# Patient Record
Sex: MALE | Race: WHITE
[De-identification: names, ages, dates, MRNs, and addresses within clinical notes are randomized per-mention and may not be internally consistent; named-entity substitution may affect disease eponyms.]

---

## 2020-03-08 ENCOUNTER — HOSPITAL ENCOUNTER (EMERGENCY)
Dept: HOSPITAL 8 - ED | Age: 55
LOS: 1 days | Discharge: HOME | End: 2020-03-09
Payer: MEDICAID

## 2020-03-08 VITALS — SYSTOLIC BLOOD PRESSURE: 110 MMHG | DIASTOLIC BLOOD PRESSURE: 79 MMHG

## 2020-03-08 VITALS — BODY MASS INDEX: 21.35 KG/M2 | HEIGHT: 67 IN | WEIGHT: 136.03 LBS

## 2020-03-08 DIAGNOSIS — Z21: ICD-10-CM
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
## 2020-03-08 DIAGNOSIS — S01.81XA: Primary | ICD-10-CM
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
## 2020-03-08 PROCEDURE — 99284 EMERGENCY DEPT VISIT MOD MDM: CPT

## 2020-03-08 PROCEDURE — 12051 INTMD RPR FACE/MM 2.5 CM/<: CPT

## 2020-03-18 ENCOUNTER — HOSPITAL ENCOUNTER (EMERGENCY)
Dept: HOSPITAL 8 - ED | Age: 55
Discharge: LEFT BEFORE DISCHARGE | End: 2020-03-18
Payer: MEDICAID

## 2020-03-18 VITALS — WEIGHT: 139.77 LBS | BODY MASS INDEX: 21.94 KG/M2 | HEIGHT: 67 IN

## 2020-03-18 VITALS — DIASTOLIC BLOOD PRESSURE: 76 MMHG | SYSTOLIC BLOOD PRESSURE: 128 MMHG

## 2020-03-18 DIAGNOSIS — R05: Primary | ICD-10-CM

## 2020-03-18 DIAGNOSIS — Z20.828: ICD-10-CM

## 2020-03-18 DIAGNOSIS — S01.411D: ICD-10-CM
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
## 2020-03-18 PROCEDURE — 71046 X-RAY EXAM CHEST 2 VIEWS: CPT

## 2020-03-18 PROCEDURE — 99283 EMERGENCY DEPT VISIT LOW MDM: CPT

## 2020-03-18 NOTE — NUR
CHARGE RN: DR BOWIE AWARE, PT POSITIVE PNEUMONIA VIA CXR.  PT HAS GEN DELIVERY 
ADDRESS, NO PHONE NUMBER.

## 2020-06-17 ENCOUNTER — OFFICE VISIT (OUTPATIENT)
Dept: MEDICAL GROUP | Facility: MEDICAL CENTER | Age: 55
End: 2020-06-17
Attending: NURSE PRACTITIONER
Payer: MEDICAID

## 2020-06-17 VITALS
HEIGHT: 70 IN | BODY MASS INDEX: 20.76 KG/M2 | DIASTOLIC BLOOD PRESSURE: 60 MMHG | OXYGEN SATURATION: 98 % | HEART RATE: 68 BPM | RESPIRATION RATE: 16 BRPM | TEMPERATURE: 99.3 F | SYSTOLIC BLOOD PRESSURE: 100 MMHG | WEIGHT: 145 LBS

## 2020-06-17 DIAGNOSIS — R10.84 GENERALIZED ABDOMINAL PAIN: ICD-10-CM

## 2020-06-17 DIAGNOSIS — Z76.89 ENCOUNTER TO ESTABLISH CARE: ICD-10-CM

## 2020-06-17 DIAGNOSIS — J45.40 MODERATE PERSISTENT ASTHMA WITHOUT COMPLICATION: ICD-10-CM

## 2020-06-17 DIAGNOSIS — G89.29 OTHER CHRONIC PAIN: ICD-10-CM

## 2020-06-17 DIAGNOSIS — B20 HIV INFECTION, UNSPECIFIED SYMPTOM STATUS (HCC): ICD-10-CM

## 2020-06-17 DIAGNOSIS — B00.9 HSV-2 (HERPES SIMPLEX VIRUS 2) INFECTION: ICD-10-CM

## 2020-06-17 DIAGNOSIS — R11.0 NAUSEA: ICD-10-CM

## 2020-06-17 DIAGNOSIS — R12 HEARTBURN: ICD-10-CM

## 2020-06-17 PROCEDURE — 99204 OFFICE O/P NEW MOD 45 MIN: CPT | Performed by: NURSE PRACTITIONER

## 2020-06-17 RX ORDER — IBUPROFEN 800 MG/1
800 TABLET ORAL 3 TIMES DAILY
Qty: 90 TAB | Refills: 3 | Status: SHIPPED | OUTPATIENT
Start: 2020-06-17 | End: 2020-12-07 | Stop reason: SDUPTHER

## 2020-06-17 RX ORDER — DICYCLOMINE HYDROCHLORIDE 10 MG/1
10 CAPSULE ORAL 3 TIMES DAILY
Qty: 90 CAP | Refills: 3 | Status: SHIPPED | OUTPATIENT
Start: 2020-06-17 | End: 2020-08-10 | Stop reason: SDUPTHER

## 2020-06-17 RX ORDER — DARUNAVIR ETHANOLATE AND COBICISTAT 800; 150 MG/1; MG/1
1 TABLET, FILM COATED ORAL DAILY
Qty: 30 TAB | Refills: 5 | Status: SHIPPED | OUTPATIENT
Start: 2020-06-17 | End: 2020-08-10 | Stop reason: SDUPTHER

## 2020-06-17 RX ORDER — SULFAMETHOXAZOLE AND TRIMETHOPRIM 800; 160 MG/1; MG/1
1 TABLET ORAL 2 TIMES DAILY
Qty: 10 TAB | Refills: 0 | Status: SHIPPED | OUTPATIENT
Start: 2020-06-17 | End: 2020-06-22

## 2020-06-17 RX ORDER — TIOTROPIUM BROMIDE AND OLODATEROL 3.124; 2.736 UG/1; UG/1
2 SPRAY, METERED RESPIRATORY (INHALATION) DAILY
Qty: 1 INHALER | Refills: 5 | Status: SHIPPED | OUTPATIENT
Start: 2020-06-17 | End: 2020-08-10 | Stop reason: SDUPTHER

## 2020-06-17 RX ORDER — ONDANSETRON 4 MG/1
4 TABLET, FILM COATED ORAL EVERY 4 HOURS PRN
Qty: 20 TAB | Refills: 5 | Status: SHIPPED | OUTPATIENT
Start: 2020-06-17 | End: 2020-07-13 | Stop reason: SDUPTHER

## 2020-06-17 RX ORDER — ALBUTEROL SULFATE 90 UG/1
2 AEROSOL, METERED RESPIRATORY (INHALATION) EVERY 6 HOURS PRN
Qty: 8.5 G | Refills: 11 | Status: SHIPPED | OUTPATIENT
Start: 2020-06-17 | End: 2020-08-10 | Stop reason: SDUPTHER

## 2020-06-17 RX ORDER — ABACAVIR SULFATE, DOLUTEGRAVIR SODIUM, LAMIVUDINE 600; 50; 300 MG/1; MG/1; MG/1
1 TABLET, FILM COATED ORAL DAILY
Qty: 30 TAB | Refills: 3 | Status: SHIPPED | OUTPATIENT
Start: 2020-06-17 | End: 2020-08-10 | Stop reason: SDUPTHER

## 2020-06-17 RX ORDER — PANTOPRAZOLE SODIUM 20 MG/1
20 TABLET, DELAYED RELEASE ORAL DAILY
Qty: 30 TAB | Refills: 3 | Status: SHIPPED | OUTPATIENT
Start: 2020-06-17 | End: 2020-08-10 | Stop reason: SDUPTHER

## 2020-06-17 RX ORDER — ACYCLOVIR 800 MG/1
800 TABLET ORAL 3 TIMES DAILY
Qty: 15 TAB | Refills: 3 | Status: SHIPPED | OUTPATIENT
Start: 2020-06-17

## 2020-06-17 RX ORDER — ACYCLOVIR 400 MG/1
400 TABLET ORAL 2 TIMES DAILY
Qty: 60 TAB | Refills: 5 | Status: SHIPPED | OUTPATIENT
Start: 2020-06-17 | End: 2020-07-13 | Stop reason: SDUPTHER

## 2020-06-17 RX ORDER — ACYCLOVIR 800 MG/1
800 TABLET ORAL 3 TIMES DAILY
Qty: 9 TAB | Refills: 3 | Status: SHIPPED | OUTPATIENT
Start: 2020-06-17 | End: 2020-06-17

## 2020-06-17 ASSESSMENT — ENCOUNTER SYMPTOMS
WHEEZING: 1
PALPITATIONS: 0
DIARRHEA: 0
ABDOMINAL PAIN: 0
CONSTIPATION: 0
CHILLS: 0
WEIGHT LOSS: 1
SHORTNESS OF BREATH: 1
FEVER: 0
COUGH: 0
BLOOD IN STOOL: 0

## 2020-06-17 NOTE — PROGRESS NOTES
"Chief Complaint   Patient presents with   • Establish Care       Subjective:     HPI:   Paul Chamberlain is a 55 y.o. male here to establish care, med refill,  and to discuss the evaluation and management of:      Encounter to establish care  Prior PCP: KATI SMALLS and Infectious disease    Other Providers:  none    HIV infection (HCC)  Diagnosed in 1990.  Currently taking Triumeq and Prezcobix.  He was previously managed at [facility].  He is requesting a new infectious disease referral.    HSV-2 (herpes simplex virus 2) infection  Current outbreak on his rectum, started several days ago.  He reports a foul odor.  He denies fever.  He reports foul-smelling purulent drainage.  In the past he is taking acyclovir and Bactrim with benefit.    Moderate persistent asthma without complication  Patient reports a many year history of asthma.  He uses albuterol as needed and Spiriva daily.  He reports he is using his rescue inhaler multiple times a day.  He reports he wakes at least once a night feeling short of breath.  He is not able to participate in many activities of daily living due to HIV related wasting.  He and his sister report that he does not take any steroid medications because \"all steroids are bad for you\".        ROS  Review of Systems   Constitutional: Positive for malaise/fatigue and weight loss. Negative for chills and fever.   Respiratory: Positive for shortness of breath and wheezing. Negative for cough.    Cardiovascular: Negative for chest pain, palpitations and leg swelling.   Gastrointestinal: Negative for abdominal pain, blood in stool, constipation and diarrhea.         Not on File    Current medicines (including changes today)  Current Outpatient Medications   Medication Sig Dispense Refill   • acyclovir (ZOVIRAX) 400 MG tablet Take 1 Tab by mouth 2 times a day. prophylactic dose 60 Tab 5   • ibuprofen (MOTRIN) 800 MG Tab Take 1 Tab by mouth 3 times a day. 90 Tab 3   • pantoprazole (PROTONIX) 20 MG " tablet Take 1 Tab by mouth every day. 30 Tab 3   • albuterol 108 (90 Base) MCG/ACT Aero Soln inhalation aerosol Inhale 2 Puffs by mouth every 6 hours as needed for Shortness of Breath. 8.5 g 11   • dicyclomine (BENTYL) 10 MG Cap Take 1 Cap by mouth 3 times a day. 90 Cap 3   • Tiotropium Bromide-Olodaterol (STIOLTO RESPIMAT) 2.5-2.5 MCG/ACT Aero Soln Inhale 2 Inhalation by mouth every day. 1 Inhaler 5   • sulfamethoxazole-trimethoprim (BACTRIM DS) 800-160 MG tablet Take 1 Tab by mouth 2 times a day for 5 days. 10 Tab 0   • Abacavir-Dolutegravir-Lamivud (TRIUMEQ) 600- MG Tab Take 1 Dose by mouth every day. 30 Tab 3   • Darunavir-Cobicistat (PREZCOBIX) 800-150 MG Tab Take 1 Dose by mouth every day. 30 Tab 5   • acyclovir (ZOVIRAX) 800 MG Tab Take 1 Tab by mouth 3 times a day. With herpes outbreak 15 Tab 3   • ondansetron (ZOFRAN) 4 MG Tab tablet Take 1 Tab by mouth every four hours as needed for Nausea/Vomiting. 20 Tab 5     No current facility-administered medications for this visit.      He  has a past medical history of Asthma, Avascular necrosis (HCC), COPD (chronic obstructive pulmonary disease) (HCC), DDD (degenerative disc disease), cervical, DDD (degenerative disc disease), thoracolumbar, HIV positive ([facility]) (04/1990), HSV-2 (herpes simplex virus 2) infection, Neuropathy, and Tinnitus.  He  has a past surgical history that includes fistula in ano repair and other orthopedic surgery (Left).  Social History     Tobacco Use   • Smoking status: Never Smoker   • Smokeless tobacco: Current User     Types: Chew   Substance Use Topics   • Alcohol use: Not on file   • Drug use: Not on file       Family History   Problem Relation Age of Onset   • Cancer Mother         unknown type, ovarian CA   • Arthritis Mother         RA   • Cancer Father         prostate CA   • Asthma Father    • Arthritis Father         RA   • Cancer Sister         unknown   • Cancer Maternal Grandmother         unknown   • Arthritis Sister      "    RA     No family status information on file.       Patient Active Problem List    Diagnosis Date Noted   • Encounter to establish care 06/17/2020   • HSV-2 (herpes simplex virus 2) infection 06/17/2020   • HIV infection (HCC) 06/17/2020   • Moderate persistent asthma without complication 06/17/2020          Objective:     /60 (BP Location: Left arm, Patient Position: Sitting, BP Cuff Size: Adult)   Pulse 68   Temp 37.4 °C (99.3 °F) (Temporal)   Resp 16   Ht 1.778 m (5' 10\")   Wt 65.8 kg (145 lb)   SpO2 98%  Body mass index is 20.81 kg/m².    Physical Exam:  Physical Exam   Constitutional: He is oriented to person, place, and time. He appears malnourished. He appears unhealthy. He appears cachectic. No distress.   HENT:   Head: Normocephalic.   Right Ear: Tympanic membrane and external ear normal.   Left Ear: Tympanic membrane and external ear normal.   Eyes: Pupils are equal, round, and reactive to light. Conjunctivae and EOM are normal.   Neck: Normal range of motion. Neck supple. No tracheal deviation present.   Cardiovascular: Normal rate, regular rhythm, normal heart sounds and intact distal pulses.   Pulmonary/Chest: Effort normal and breath sounds normal.   Abdominal: Soft. Bowel sounds are normal.   Genitourinary:    Genitourinary Comments: Presence of HSV infection on rectum, multiple lesions.  Purulent drainage from 1 lesion.  Area warm to touch.  Area erythematous     Musculoskeletal: Normal range of motion.   Lymphadenopathy:        Head (right side): No preauricular adenopathy present.        Head (left side): No preauricular adenopathy present.     He has no cervical adenopathy.   Neurological: He is alert and oriented to person, place, and time. He has normal sensation, normal strength and intact cranial nerves. Gait normal.   Skin: Skin is warm and dry.   Psychiatric: Affect and judgment normal.          Assessment and Plan:     The following treatment plan was discussed:    1. HSV-2 " (herpes simplex virus 2) infection  acyclovir (ZOVIRAX) 400 MG tablet    sulfamethoxazole-trimethoprim (BACTRIM DS) 800-160 MG tablet    acyclovir (ZOVIRAX) 800 MG Tab    -Chronic problem, unstable.  Suspect secondary infection.  Will treat with high-dose acyclovir 800 mg 3 times daily for 5 days and Bactrim.  Patient will return next week to ensure resolution of infection.  Strict ER precautions reviewed multiple times with patient and his sister, both verbalized understanding.  Upon resolution of his HSV outbreak he will resume his prophylactic dose of acyclovir 400 mg twice daily.  We had initially scheduled the patient for follow-up for a recheck of this infection on Tuesday at 6/23 at 720.  The patient's sister reported that they would be unable to obtain that appointment due to it being too early in the morning, they refused all appointments prior to noon.  We were able to get them scheduled on 6/25 at 4 PM.   2. HIV infection, unspecified symptom status (HCC)  REFERRAL TO INFECTIOUS DISEASE    REFERRAL TO COMPLEX CARE MANAGEMENT Services Requested: Care Manager to Evaluate and Recommend    Abacavir-Dolutegravir-Lamivud (TRIUMEQ) 600- MG Tab    Darunavir-Cobicistat (PREZCOBIX) 800-150 MG Tab  -Chronic problem, unclear stability.  Urgent referral to infectious disease placed.  Referral to complex care management placed this patient is food insecure and has trouble managing his health care.  Refill of his Triumeq and Prezcobix.   3. Moderate persistent asthma without complication  albuterol 108 (90 Base) MCG/ACT Aero Soln inhalation aerosol    Tiotropium Bromide-Olodaterol (STIOLTO RESPIMAT) 2.5-2.5 MCG/ACT Aero Soln  -Chronic problem, unstable.  Patient is unwilling to do an inhaled Corticosteroid although I have explained safety of this medication to him and his sister.  He is willing to try Stiolto in place of his Spiriva to see if we can get improvement in his respiratory status.  If we do not get  improvement we will have another conversation regarding inhaled corticosteroids.  I have encouraged the patient to research the safety of inhaled corticosteroids.  ER precautions reviewed.   4. Encounter to establish care       Any change or worsening of signs or symptoms, patient encouraged to follow-up or report to emergency room for further evaluation. Patient verbalizes understanding and agrees.    Follow-Up: Return in about 8 days (around 6/25/2020) for Anal infection recheck.      PLEASE NOTE: This dictation was created using voice recognition software. I have made every reasonable attempt to correct obvious errors, but I expect that there are errors of grammar and possibly content that I did not discover before finalizing the note.

## 2020-06-17 NOTE — LETTER
June 17, 2020       Patient: Paul Chamberlain   YOB: 1965   Date of Visit: 6/17/2020         To Whom It May Concern:    In my medical opinion, Paul Chamberlain requires medical staff to come to his apartment without chaperone as he is unable to ambulate to chaperone them.      If you have any questions or concerns, please don't hesitate to call [phone]          Sincerely,          AVRIL Rader.P.R.N.  Electronically Signed

## 2020-06-17 NOTE — ASSESSMENT & PLAN NOTE
Diagnosed in 1990.  Currently taking Triumeq and Prezcobix.  He was previously managed at [facility].  He is requesting a new infectious disease referral.

## 2020-06-18 NOTE — ASSESSMENT & PLAN NOTE
"Patient reports a many year history of asthma.  He uses albuterol as needed and Spiriva daily.  He reports he is using his rescue inhaler multiple times a day.  He reports he wakes at least once a night feeling short of breath.  He is not able to participate in many activities of daily living due to HIV related wasting.  He and his sister report that he does not take any steroid medications because \"all steroids are bad for you\".    "

## 2020-06-18 NOTE — ASSESSMENT & PLAN NOTE
Current outbreak on his rectum, started several days ago.  He reports a foul odor.  He denies fever.  He reports foul-smelling purulent drainage.  In the past he is taking acyclovir and Bactrim with benefit.

## 2020-06-22 ENCOUNTER — PATIENT OUTREACH (OUTPATIENT)
Dept: HEALTH INFORMATION MANAGEMENT | Facility: OTHER | Age: 55
End: 2020-06-22

## 2020-06-22 SDOH — ECONOMIC STABILITY: INCOME INSECURITY: HOW HARD IS IT FOR YOU TO PAY FOR THE VERY BASICS LIKE FOOD, HOUSING, MEDICAL CARE, AND HEATING?: SOMEWHAT HARD

## 2020-06-22 SDOH — ECONOMIC STABILITY: FOOD INSECURITY: WITHIN THE PAST 12 MONTHS, THE FOOD YOU BOUGHT JUST DIDN'T LAST AND YOU DIDN'T HAVE MONEY TO GET MORE.: OFTEN TRUE

## 2020-06-22 SDOH — ECONOMIC STABILITY: TRANSPORTATION INSECURITY
IN THE PAST 12 MONTHS, HAS THE LACK OF TRANSPORTATION KEPT YOU FROM MEDICAL APPOINTMENTS OR FROM GETTING MEDICATIONS?: YES

## 2020-06-22 SDOH — ECONOMIC STABILITY: FOOD INSECURITY: WITHIN THE PAST 12 MONTHS, YOU WORRIED THAT YOUR FOOD WOULD RUN OUT BEFORE YOU GOT MONEY TO BUY MORE.: OFTEN TRUE

## 2020-06-22 SDOH — ECONOMIC STABILITY: TRANSPORTATION INSECURITY
IN THE PAST 12 MONTHS, HAS LACK OF TRANSPORTATION KEPT YOU FROM MEETINGS, WORK, OR FROM GETTING THINGS NEEDED FOR DAILY LIVING?: YES

## 2020-06-22 NOTE — PROGRESS NOTES
Community Health Worker Intake  • Social determinates of health intake complete.   • Identified barriers to food insecurity.  • Contact information provided to Paul Chamberlain.  • Has PCP appointment scheduled for 6/25 at [facility] with Daxa Mcneil.  • Outpatient assessment completed.    6/22 CHW Luiz spoke to pt via telephone and introduced CCM services. Pt expressed his sister is his caregiver. Pt stated he is interested in getting reduced fare bus pass because he is disabled. CHW will provide pt with [employer] Reduced Fare Disabled application when he goes to his PCP appt on Thurs, 6/25. CHW will schedule a MTM ride for pt and his sister, his caregiver.     Plan: CHW will call pt in the afternoon to confirm address and will call MTM after.     6/24 MEIR Dee called MTM 6/22, 6/23 and 6/24 to get this medical form re: his sister accompanying him on his trips to medical appts. PCP and CHW has not received the form via fax. CHW will wait until noon for fax. If it is not received, CHW will call MTM again.

## 2020-06-25 ENCOUNTER — OFFICE VISIT (OUTPATIENT)
Dept: MEDICAL GROUP | Facility: MEDICAL CENTER | Age: 55
End: 2020-06-25
Attending: NURSE PRACTITIONER
Payer: MEDICAID

## 2020-06-25 VITALS
DIASTOLIC BLOOD PRESSURE: 60 MMHG | TEMPERATURE: 99.1 F | OXYGEN SATURATION: 98 % | RESPIRATION RATE: 16 BRPM | HEART RATE: 74 BPM | BODY MASS INDEX: 20.47 KG/M2 | WEIGHT: 143 LBS | SYSTOLIC BLOOD PRESSURE: 110 MMHG | HEIGHT: 70 IN

## 2020-06-25 DIAGNOSIS — Z13.228 SCREENING FOR METABOLIC DISORDER: ICD-10-CM

## 2020-06-25 DIAGNOSIS — Z11.3 ROUTINE SCREENING FOR STI (SEXUALLY TRANSMITTED INFECTION): ICD-10-CM

## 2020-06-25 DIAGNOSIS — R63.6 UNDERWEIGHT: ICD-10-CM

## 2020-06-25 DIAGNOSIS — Z13.21 ENCOUNTER FOR VITAMIN DEFICIENCY SCREENING: ICD-10-CM

## 2020-06-25 DIAGNOSIS — B00.9 HSV-2 (HERPES SIMPLEX VIRUS 2) INFECTION: ICD-10-CM

## 2020-06-25 DIAGNOSIS — Z59.41 FOOD INSECURITY: ICD-10-CM

## 2020-06-25 DIAGNOSIS — R53.83 OTHER FATIGUE: ICD-10-CM

## 2020-06-25 DIAGNOSIS — Z13.6 SCREENING FOR CARDIOVASCULAR CONDITION: ICD-10-CM

## 2020-06-25 PROCEDURE — 99213 OFFICE O/P EST LOW 20 MIN: CPT | Performed by: NURSE PRACTITIONER

## 2020-06-25 PROCEDURE — 99214 OFFICE O/P EST MOD 30 MIN: CPT | Performed by: NURSE PRACTITIONER

## 2020-06-25 RX ORDER — ONDANSETRON HYDROCHLORIDE 8 MG/1
8 TABLET, FILM COATED ORAL
COMMUNITY
Start: 2020-05-06 | End: 2020-06-25

## 2020-06-25 SDOH — ECONOMIC STABILITY - FOOD INSECURITY: FOOD INSECURITY: Z59.41

## 2020-06-25 ASSESSMENT — ENCOUNTER SYMPTOMS
COUGH: 0
SHORTNESS OF BREATH: 0
CHILLS: 0
PALPITATIONS: 0
WHEEZING: 0
BLOOD IN STOOL: 0
DIARRHEA: 0
ABDOMINAL PAIN: 0
FEVER: 0
WEIGHT LOSS: 1
CONSTIPATION: 0

## 2020-06-25 NOTE — LETTER
[facility]  SHANNON Rader.  [address]  [place] [address]  Fax: [phone]   Authorization for Release/Disclosure of   Protected Health Information   Name: NEVAEH KEN : 1965 SSN: [id number]   Address: [address]  [place] [address] Phone:    [phone] (home)    I authorize the entity listed below to release/disclose the PHI below to:   [facility]/CATALINA Rader and CATALINA Rader   Provider or Entity Name:  [facility]  Daxa Horton     Address   City, [facility]   Phone:      Fax:     Reason for request: continuity of care   Information to be released:    [  ] LAST COLONOSCOPY,  including any PATH REPORT and follow-up  [  ] LAST FIT/COLOGUARD RESULT [  ] LAST DEXA  [  ] LAST MAMMOGRAM  [  ] LAST PAP  [  ] LAST LABS [  ] RETINA EXAM REPORT  [  ] IMMUNIZATION RECORDS  [ x ] Release all info      [  ] Check here and initial the line next to each item to release ALL health information INCLUDING  _____ Care and treatment for drug and / or alcohol abuse  _____ HIV testing, infection status, or AIDS  _____ Genetic Testing    DATES OF SERVICE OR TIME PERIOD TO BE DISCLOSED: _____________  I understand and acknowledge that:  * This Authorization may be revoked at any time by you in writing, except if your health information has already been used or disclosed.  * Your health information that will be used or disclosed as a result of you signing this authorization could be re-disclosed by the recipient. If this occurs, your re-disclosed health information may no longer be protected by State or Federal laws.  * You may refuse to sign this Authorization. Your refusal will not affect your ability to obtain treatment.  * This Authorization becomes effective upon signing and will  on (date) __________.      If no date is indicated, this Authorization will  one (1) year from the signature date.     Name: Paul Chamberlain    Signature:   Date:     6/25/2020       PLEASE FAX REQUESTED RECORDS BACK TO: [phone]

## 2020-06-25 NOTE — ASSESSMENT & PLAN NOTE
Reports improvement in his pain and drainage.  He has taken his acyclovir and abx as prescribed.  He denies fevers at home.  He reports the odor has resolved and has had scant drainage in his briefs for the last several days.

## 2020-06-25 NOTE — PATIENT INSTRUCTIONS
[facility]- call for Lucy     [phone].     Schedule with Infectious Disease, schedule with me for one week after your visit with Infectious Disease.  Or sooner if you need it

## 2020-06-25 NOTE — PROGRESS NOTES
Chief Complaint   Patient presents with   • Follow-Up       Subjective:     HPI:   Paul Chamberlain is a 55 y.o. male here to discuss the evaluation and management of:        HSV-2 (herpes simplex virus 2) infection  Reports improvement in his pain and drainage.  He has taken his acyclovir and abx as prescribed.  He denies fevers at home.  He reports the odor has resolved and has had scant drainage in his briefs for the last several days.      ROS  Review of Systems   Constitutional: Positive for malaise/fatigue and weight loss. Negative for chills and fever.   Respiratory: Negative for cough, shortness of breath and wheezing.    Cardiovascular: Negative for chest pain, palpitations and leg swelling.   Gastrointestinal: Negative for abdominal pain, blood in stool, constipation and diarrhea.         Not on File    Current medicines (including changes today)  Current Outpatient Medications   Medication Sig Dispense Refill   • Misc. Devices Misc 1 Device by Does not apply route Q30 DAYS. Please provide the patient with 1 case of Ensure/boost monthly. 1 Device 0   • acyclovir (ZOVIRAX) 400 MG tablet Take 1 Tab by mouth 2 times a day. prophylactic dose 60 Tab 5   • ibuprofen (MOTRIN) 800 MG Tab Take 1 Tab by mouth 3 times a day. 90 Tab 3   • pantoprazole (PROTONIX) 20 MG tablet Take 1 Tab by mouth every day. 30 Tab 3   • albuterol 108 (90 Base) MCG/ACT Aero Soln inhalation aerosol Inhale 2 Puffs by mouth every 6 hours as needed for Shortness of Breath. 8.5 g 11   • dicyclomine (BENTYL) 10 MG Cap Take 1 Cap by mouth 3 times a day. 90 Cap 3   • Tiotropium Bromide-Olodaterol (STIOLTO RESPIMAT) 2.5-2.5 MCG/ACT Aero Soln Inhale 2 Inhalation by mouth every day. 1 Inhaler 5   • Abacavir-Dolutegravir-Lamivud (TRIUMEQ) 600- MG Tab Take 1 Dose by mouth every day. 30 Tab 3   • Darunavir-Cobicistat (PREZCOBIX) 800-150 MG Tab Take 1 Dose by mouth every day. 30 Tab 5   • acyclovir (ZOVIRAX) 800 MG Tab Take 1 Tab by mouth 3 times  "a day. With herpes outbreak 15 Tab 3   • ondansetron (ZOFRAN) 4 MG Tab tablet Take 1 Tab by mouth every four hours as needed for Nausea/Vomiting. 20 Tab 5     No current facility-administered medications for this visit.        Social History     Tobacco Use   • Smoking status: Never Smoker   • Smokeless tobacco: Current User     Types: Chew   Substance Use Topics   • Alcohol use: Not on file   • Drug use: Not on file       Patient Active Problem List    Diagnosis Date Noted   • Encounter to establish care 06/17/2020   • HSV-2 (herpes simplex virus 2) infection 06/17/2020   • HIV infection (HCC) 06/17/2020   • Moderate persistent asthma without complication 06/17/2020       Family History   Problem Relation Age of Onset   • Cancer Mother         unknown type, ovarian CA   • Arthritis Mother         RA   • Cancer Father         prostate CA   • Asthma Father    • Arthritis Father         RA   • Cancer Sister         unknown   • Cancer Maternal Grandmother         unknown   • Arthritis Sister         RA          Objective:     /60 (BP Location: Left arm, Patient Position: Sitting, BP Cuff Size: Adult)   Pulse 74   Temp 37.3 °C (99.1 °F) (Temporal)   Resp 16   Ht 1.778 m (5' 10\")   Wt 64.9 kg (143 lb)   SpO2 98%  Body mass index is 20.52 kg/m².    Physical Exam:  Physical Exam   Constitutional: He is oriented to person, place, and time. He appears malnourished. No distress.   HENT:   Head: Normocephalic.   Right Ear: Tympanic membrane and external ear normal.   Left Ear: Tympanic membrane and external ear normal.   Eyes: Pupils are equal, round, and reactive to light. Conjunctivae and EOM are normal.   Neck: Normal range of motion. Neck supple. No tracheal deviation present.   Cardiovascular: Normal rate, regular rhythm, normal heart sounds and intact distal pulses.   Pulmonary/Chest: Effort normal and breath sounds normal.   Abdominal: Soft. Bowel sounds are normal.   Genitourinary: Rectum:      External " hemorrhoid present.     Musculoskeletal: Normal range of motion.   Lymphadenopathy:        Head (right side): No preauricular adenopathy present.        Head (left side): No preauricular adenopathy present.     He has no cervical adenopathy.   Neurological: He is alert and oriented to person, place, and time. He has normal sensation, normal strength and intact cranial nerves. Gait normal.   Skin: Skin is warm and dry.   Psychiatric: Affect and judgment normal.       Assessment and Plan:     The following treatment plan was discussed:    1. HSV-2 (herpes simplex virus 2) infection   chronic problem, improved.  No signs of active secondary infection or herpes lesions.  Advised patient to monitor closely for signs of recurrent infection, patient verbalized understanding   2. Screening for metabolic disorder  Comp Metabolic Panel    HEMOGLOBIN A1C    TSH WITH REFLEX TO FT4   3. Screening for cardiovascular condition  Lipid Profile   4. Routine screening for STI (sexually transmitted infection)     5. Encounter for vitamin deficiency screening  VITAMIN D,25 HYDROXY   6. Other fatigue  TESTOSTERONE SERUM       Any change or worsening of signs or symptoms, patient encouraged to follow-up or report to emergency room for further evaluation. Patient verbalizes understanding and agrees.    Follow-Up: Return for After infectious disease appointment or sooner as needed.      PLEASE NOTE: This dictation was created using voice recognition software. I have made every reasonable attempt to correct obvious errors, but I expect that there are errors of grammar and possibly content that I did not discover before finalizing the note.

## 2020-06-29 ENCOUNTER — PATIENT OUTREACH (OUTPATIENT)
Dept: HEALTH INFORMATION MANAGEMENT | Facility: OTHER | Age: 55
End: 2020-06-29

## 2020-06-29 ENCOUNTER — HOSPITAL ENCOUNTER (OUTPATIENT)
Dept: LAB | Facility: MEDICAL CENTER | Age: 55
End: 2020-06-29
Attending: NURSE PRACTITIONER
Payer: MEDICAID

## 2020-06-29 DIAGNOSIS — Z13.21 ENCOUNTER FOR VITAMIN DEFICIENCY SCREENING: ICD-10-CM

## 2020-06-29 DIAGNOSIS — Z13.6 SCREENING FOR CARDIOVASCULAR CONDITION: ICD-10-CM

## 2020-06-29 DIAGNOSIS — Z13.228 SCREENING FOR METABOLIC DISORDER: ICD-10-CM

## 2020-06-29 DIAGNOSIS — R53.83 OTHER FATIGUE: ICD-10-CM

## 2020-06-29 LAB
25(OH)D3 SERPL-MCNC: 17 NG/ML (ref 30–100)
ALBUMIN SERPL BCP-MCNC: 4 G/DL (ref 3.2–4.9)
ALBUMIN/GLOB SERPL: 1.3 G/DL
ALP SERPL-CCNC: 58 U/L (ref 30–99)
ALT SERPL-CCNC: 30 U/L (ref 2–50)
ANION GAP SERPL CALC-SCNC: 11 MMOL/L (ref 7–16)
AST SERPL-CCNC: 44 U/L (ref 12–45)
BILIRUB SERPL-MCNC: 0.4 MG/DL (ref 0.1–1.5)
BUN SERPL-MCNC: 12 MG/DL (ref 8–22)
CALCIUM SERPL-MCNC: 9 MG/DL (ref 8.5–10.5)
CHLORIDE SERPL-SCNC: 104 MMOL/L (ref 96–112)
CHOLEST SERPL-MCNC: 163 MG/DL (ref 100–199)
CO2 SERPL-SCNC: 24 MMOL/L (ref 20–33)
CREAT SERPL-MCNC: 0.72 MG/DL (ref 0.5–1.4)
EST. AVERAGE GLUCOSE BLD GHB EST-MCNC: 120 MG/DL
FASTING STATUS PATIENT QL REPORTED: NORMAL
GLOBULIN SER CALC-MCNC: 3.1 G/DL (ref 1.9–3.5)
GLUCOSE SERPL-MCNC: 75 MG/DL (ref 65–99)
HBA1C MFR BLD: 5.8 % (ref 0–5.6)
HDLC SERPL-MCNC: 48 MG/DL
LDLC SERPL CALC-MCNC: 95 MG/DL
POTASSIUM SERPL-SCNC: 4 MMOL/L (ref 3.6–5.5)
PROT SERPL-MCNC: 7.1 G/DL (ref 6–8.2)
SODIUM SERPL-SCNC: 139 MMOL/L (ref 135–145)
TESTOST SERPL-MCNC: 505 NG/DL (ref 175–781)
TRIGL SERPL-MCNC: 102 MG/DL (ref 0–149)
TSH SERPL DL<=0.005 MIU/L-ACNC: 1.36 UIU/ML (ref 0.38–5.33)

## 2020-06-29 PROCEDURE — 84443 ASSAY THYROID STIM HORMONE: CPT

## 2020-06-29 PROCEDURE — 36415 COLL VENOUS BLD VENIPUNCTURE: CPT

## 2020-06-29 PROCEDURE — 82306 VITAMIN D 25 HYDROXY: CPT

## 2020-06-29 PROCEDURE — 80061 LIPID PANEL: CPT

## 2020-06-29 PROCEDURE — 84403 ASSAY OF TOTAL TESTOSTERONE: CPT

## 2020-06-29 PROCEDURE — 83036 HEMOGLOBIN GLYCOSYLATED A1C: CPT

## 2020-06-29 PROCEDURE — 80053 COMPREHEN METABOLIC PANEL: CPT

## 2020-07-13 ENCOUNTER — PATIENT OUTREACH (OUTPATIENT)
Dept: HEALTH INFORMATION MANAGEMENT | Facility: OTHER | Age: 55
End: 2020-07-13

## 2020-07-13 ENCOUNTER — OFFICE VISIT (OUTPATIENT)
Dept: MEDICAL GROUP | Facility: MEDICAL CENTER | Age: 55
End: 2020-07-13
Attending: NURSE PRACTITIONER
Payer: MEDICAID

## 2020-07-13 VITALS
TEMPERATURE: 96 F | SYSTOLIC BLOOD PRESSURE: 114 MMHG | HEART RATE: 74 BPM | BODY MASS INDEX: 21.99 KG/M2 | OXYGEN SATURATION: 98 % | WEIGHT: 140.1 LBS | HEIGHT: 67 IN | DIASTOLIC BLOOD PRESSURE: 68 MMHG

## 2020-07-13 DIAGNOSIS — E55.9 VITAMIN D DEFICIENCY: ICD-10-CM

## 2020-07-13 DIAGNOSIS — R00.2 PALPITATIONS: ICD-10-CM
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
## 2020-07-13 DIAGNOSIS — J30.1 HAYFEVER: ICD-10-CM

## 2020-07-13 DIAGNOSIS — B00.9 HSV-2 (HERPES SIMPLEX VIRUS 2) INFECTION: ICD-10-CM

## 2020-07-13 DIAGNOSIS — R11.0 NAUSEA: ICD-10-CM

## 2020-07-13 PROCEDURE — 99214 OFFICE O/P EST MOD 30 MIN: CPT | Performed by: NURSE PRACTITIONER

## 2020-07-13 RX ORDER — ACYCLOVIR 400 MG/1
400 TABLET ORAL 2 TIMES DAILY
Qty: 60 TAB | Refills: 5 | Status: SHIPPED | OUTPATIENT
Start: 2020-07-13 | End: 2020-08-10 | Stop reason: SDUPTHER

## 2020-07-13 RX ORDER — ONDANSETRON 4 MG/1
4 TABLET, FILM COATED ORAL EVERY 4 HOURS PRN
Qty: 20 TAB | Refills: 5 | Status: SHIPPED | OUTPATIENT
Start: 2020-07-13 | End: 2020-08-10 | Stop reason: SDUPTHER

## 2020-07-13 RX ORDER — LORATADINE 10 MG/1
10 TABLET, ORALLY DISINTEGRATING ORAL DAILY
Qty: 30 TAB | Refills: 11 | Status: SHIPPED | OUTPATIENT
Start: 2020-07-13 | End: 2020-08-10

## 2020-07-13 RX ORDER — ERGOCALCIFEROL 1.25 MG/1
50000 CAPSULE ORAL
Qty: 12 CAP | Refills: 1 | Status: SHIPPED | OUTPATIENT
Start: 2020-07-13 | End: 2021-02-17

## 2020-07-13 ASSESSMENT — ENCOUNTER SYMPTOMS
DIARRHEA: 0
BLOOD IN STOOL: 0
PALPITATIONS: 0
ABDOMINAL PAIN: 0
WEIGHT LOSS: 0
COUGH: 0
SHORTNESS OF BREATH: 0
WHEEZING: 0
CONSTIPATION: 0
FEVER: 0
CHILLS: 0

## 2020-07-13 NOTE — ASSESSMENT & PLAN NOTE
Present for years.  He reports when stays up late or is very tired.  He reports an irregular heart beat.  He hears pounding in his ears and feels dizzy and SOB. He reports he sees a flash of light when this is happening. He feels he knows when the palpitations are coming.  He reports this happens about three times per month.  It lasts until he able to get to sleep.  He has not fallen when this is happening, he has not lost consciousness. He reports he was seen by cardiology when he was in [place], but was told he is fine.

## 2020-07-13 NOTE — PROGRESS NOTES
Chief Complaint   Patient presents with   • Follow-Up       Subjective:     HPI:   Paul Chamberlain is a 55 y.o. male here to discuss the evaluation and management of:        Vitamin D deficiency  Vit d level was 17- no current supplementation    Falls  Present for years. Pt reports he falls frequently.  He wears a helmet consistently for fear of injury.  He reports that her fell last week.  He denies injury.  He denies hitting his head. He reports he falls backward or side to side, he reports he does not fall forward.  He describes his falls as being caused by weakness in his LLE.  He reports generalized weakness.  He does trip sometimes because he reports the toes on his left foot are weaker.      Palpitations  Present for years.  He reports when stays up late or is very tired.  He reports an irregular heart beat.  He hears pounding in his ears and feels dizzy and SOB. He reports he sees a flash of light when this is happening. He feels he knows when the palpitations are coming.  He reports this happens about three times per month.  It lasts until he able to get to sleep.  He has not fallen when this is happening, he has not lost consciousness. He reports he was seen by cardiology when he was in [place], but was told he is fine.        ROS  Review of Systems   Constitutional: Negative for chills, fever, malaise/fatigue and weight loss.   HENT: Positive for congestion.    Respiratory: Negative for cough, shortness of breath and wheezing.    Cardiovascular: Negative for chest pain, palpitations and leg swelling.   Gastrointestinal: Negative for abdominal pain, blood in stool, constipation and diarrhea.         Not on File    Current medicines (including changes today)  Current Outpatient Medications   Medication Sig Dispense Refill   • ergocalciferol (DRISDOL) 32613 UNIT capsule Take 1 Cap by mouth every 7 days. 12 Cap 1   • acyclovir (ZOVIRAX) 400 MG tablet Take 1 Tab by mouth 2 times a day. prophylactic dose 60  Tab 5   • ondansetron (ZOFRAN) 4 MG Tab tablet Take 1 Tab by mouth every four hours as needed for Nausea/Vomiting. 20 Tab 5   • loratadine (CLARITIN REDITABS) 10 MG dissolvable tablet Take 1 Tab by mouth every day. 30 Tab 11   • Misc. Devices Misc 1 Device by Does not apply route Q30 DAYS. Please provide the patient with 1 case of Ensure/boost monthly. 1 Device 0   • ibuprofen (MOTRIN) 800 MG Tab Take 1 Tab by mouth 3 times a day. 90 Tab 3   • pantoprazole (PROTONIX) 20 MG tablet Take 1 Tab by mouth every day. 30 Tab 3   • albuterol 108 (90 Base) MCG/ACT Aero Soln inhalation aerosol Inhale 2 Puffs by mouth every 6 hours as needed for Shortness of Breath. 8.5 g 11   • dicyclomine (BENTYL) 10 MG Cap Take 1 Cap by mouth 3 times a day. 90 Cap 3   • Tiotropium Bromide-Olodaterol (STIOLTO RESPIMAT) 2.5-2.5 MCG/ACT Aero Soln Inhale 2 Inhalation by mouth every day. 1 Inhaler 5   • Abacavir-Dolutegravir-Lamivud (TRIUMEQ) 600- MG Tab Take 1 Dose by mouth every day. 30 Tab 3   • Darunavir-Cobicistat (PREZCOBIX) 800-150 MG Tab Take 1 Dose by mouth every day. 30 Tab 5   • acyclovir (ZOVIRAX) 800 MG Tab Take 1 Tab by mouth 3 times a day. With herpes outbreak 15 Tab 3     No current facility-administered medications for this visit.        Social History     Tobacco Use   • Smoking status: Never Smoker   • Smokeless tobacco: Current User     Types: Chew   Substance Use Topics   • Alcohol use: Not on file   • Drug use: Not on file       Patient Active Problem List    Diagnosis Date Noted   • Vitamin D deficiency 07/13/2020   • Falls 07/13/2020   • Palpitations 07/13/2020   • Hayfever 07/13/2020   • Encounter to establish care 06/17/2020   • HSV-2 (herpes simplex virus 2) infection 06/17/2020   • HIV infection (HCC) 06/17/2020   • Moderate persistent asthma without complication 06/17/2020       Family History   Problem Relation Age of Onset   • Cancer Mother         unknown type, ovarian CA   • Arthritis Mother         RA   •  "Cancer Father         prostate CA   • Asthma Father    • Arthritis Father         RA   • Cancer Sister         unknown   • Cancer Maternal Grandmother         unknown   • Arthritis Sister         RA          Objective:     /68 (BP Location: Right arm, Patient Position: Sitting, BP Cuff Size: Adult)   Pulse 74   Temp (!) 35.6 °C (96 °F) (Temporal)   Ht 1.702 m (5' 7\")   Wt 63.5 kg (140 lb 1.6 oz)   SpO2 98%  Body mass index is 21.94 kg/m².    Physical Exam:  Physical Exam   Constitutional: He is oriented to person, place, and time. He appears malnourished. No distress.   HENT:   Head: Normocephalic.   Right Ear: Tympanic membrane and external ear normal.   Left Ear: Tympanic membrane and external ear normal.   Eyes: Pupils are equal, round, and reactive to light. Conjunctivae and EOM are normal.   Neck: Normal range of motion. Neck supple. No tracheal deviation present.   Cardiovascular: Normal rate, regular rhythm, normal heart sounds and intact distal pulses. PMI is not displaced. Exam reveals no gallop, no S3, no S4, no distant heart sounds and no friction rub.   No murmur heard.  Pulmonary/Chest: Effort normal and breath sounds normal.   Abdominal: Soft. Bowel sounds are normal.   Musculoskeletal: Normal range of motion.   Lymphadenopathy:        Head (right side): No preauricular adenopathy present.        Head (left side): No preauricular adenopathy present.     He has no cervical adenopathy.   Neurological: He is alert and oriented to person, place, and time. He has normal sensation, normal strength and intact cranial nerves. Gait normal.   Skin: Skin is warm and dry.   Psychiatric: Affect and judgment normal.       Assessment and Plan:     The following treatment plan was discussed:    1. Vitamin D deficiency  ergocalciferol (DRISDOL) 96968 UNIT capsule  -New problem, unstable.  We will initiate ergocalciferol 50,000 units weekly.    2. Fall, initial encounter  REFERRAL TO PHYSICAL THERAPY Reason " for Therapy: Eval/Treat/Report    REFERRAL TO OCCUPATIONAL THERAPY Reason for Therapy: Eval/Treat/Report  -Chronic problem, unstable.  Suspect his poor nutrition is exacerbating his weakness.  Get him to physical and Occupational Therapy for generalized weakness as well as an electric wheelchair eval.  He is not on any medications that I suspect are contributing to his falls.  He denies dizziness.  He reports that he falls due to the weakness in the muscles in his left leg.   3. Palpitations  EC-ECHOCARDIOGRAM COMPLETE W/O CONT  -Chronic problem, unstable.  We will get an echocardiogram to evaluate his heart.  No symptoms of ACS.  ER precautions reviewed, patient verbalized understanding.   4. HSV-2 (herpes simplex virus 2) infection  acyclovir (ZOVIRAX) 400 MG tablet  -Problem, stable.  Medication refill.   5. Nausea  ondansetron (ZOFRAN) 4 MG Tab tablet  -Problem, stable.  Medication refill.   6. Hayfever  loratadine (CLARITIN REDITABS) 10 MG dissolvable tablet  -Chronic problem, unstable.  We will try Claritin to see if this helps his hayfever symptoms.  Unfortunately Flonase is contraindicated with his antiretrovirals.       Any change or worsening of signs or symptoms, patient encouraged to follow-up or report to emergency room for further evaluation. Patient verbalizes understanding and agrees.    Follow-Up: Return in about 4 weeks (around 8/10/2020) for Routine follow up, Long.      PLEASE NOTE: This dictation was created using voice recognition software. I have made every reasonable attempt to correct obvious errors, but I expect that there are errors of grammar and possibly content that I did not discover before finalizing the note.

## 2020-07-13 NOTE — PROGRESS NOTES
7/13 CHW Bandoma and pt's PCP discussed further resources pt needs. CHW Bandoma will schedule food delivery with CHW Sanjeev. PCP states pt needs assistance from SW. CHW referred pt to SW.    Outcome: Pt will be d/c from CHW caseload and referred to JOAQUIM Elizabeth per pt's PCP 7/13.

## 2020-07-13 NOTE — ASSESSMENT & PLAN NOTE
Present for years. Pt reports he falls frequently.  He wears a helmet consistently for fear of injury.  He reports that her fell last week.  He denies injury.  He denies hitting his head. He reports he falls backward or side to side, he reports he does not fall forward.  He describes his falls as being caused by weakness in his LLE.  He reports generalized weakness.  He does trip sometimes because he reports the toes on his left foot are weaker.

## 2020-07-15 ENCOUNTER — PATIENT OUTREACH (OUTPATIENT)
Dept: HEALTH INFORMATION MANAGEMENT | Facility: OTHER | Age: 55
End: 2020-07-15

## 2020-07-21 ENCOUNTER — NON-PROVIDER VISIT (OUTPATIENT)
Dept: MEDICAL GROUP | Facility: MEDICAL CENTER | Age: 55
End: 2020-07-21
Attending: NURSE PRACTITIONER
Payer: MEDICAID

## 2020-07-21 ENCOUNTER — PATIENT OUTREACH (OUTPATIENT)
Dept: HEALTH INFORMATION MANAGEMENT | Facility: OTHER | Age: 55
End: 2020-07-21

## 2020-07-21 NOTE — PROGRESS NOTES
COMMUNITY CARE MANAGEMENT SOCIAL WORK ASSESSMENT      NARRATIVE:    [facility] MSW spoke with Pt and [facility] staff, conducted Chart Review to obtain the information used in this assessment.   Pt is completely independent in ADLs/IADLs.  Pt. has a history of falling, he wears a helmet for added protection.  Patient subsists on $771 SSD and he shares an apartment with his sister who also subsists on $770 SSD.  Patient and his sister are requesting assistance to access [facility] Welfare benefits.     [facility] referred Patient as he has expressed the following needs:    -Food Insecurity  -Needs assistance with the [place] State Welfare application process.       Pt. would like to access:  -Food Resources   -Welfare Assistance    Patient received the following assistance:    -[employer]  -Food Bags from [facility] Food [facility]  -[facility] support/assistance     Pt stated goal(s) for SW involvement:     Pt. seeking to address difficulties with the [place] State Welfare application process.                    Pt agreeable to the following services and referrals:     -[facility]  -RT transportation assistance   -[facility] ([facility])  -[employer]     Collaterals: TBD    Financial Limitations/difficulties:  Pt subsists on $771 SSD.  He is seeking to supplement his needs by addressing food insecurity and accessing community resources that can enhance his quality of life.    Food Insecurities:  Pt given [employer] Rx.   Lack of housing/environmental issues:  Pt did not express any needs in this area.  Transportation:  Pt. utilizes Uber service facilitated by [facility].     Social Isolation/support/ (NOK):  Patient lives with Sister and she is very supportive of him.   Loss of independence:   Patient does not have ADL/IADL barriers.   Advanced Life Planning:  We did not have enough time to address this topic.  I will address this section with Pt at a later time.   Assessments completed:  General Care  Management    Plan:  Patient added to caseload  Plan of Care Established  Episode opened     INFORMATION SOURCE:      Patient   ORIENTATION:       x4  WHO IS RESPONSIBLE FOR MAKING DECISIONS FOR PATIENT?              Patient   SDOH:  Primary care provider:      TALYA Rader  Lives with:        Sister   Support Systems:       Limited support   Housing/Facility:       Apartment     Ability to obtain and take medication as prescribed:     Pt able to obtain medication  Reasons why not taking medications:    N/A  Mobility issues:       None  Prior services (list)      N/A              Durable Medical Equipment:     None  Transportation barriers:      None  Financial barriers:     None   Source of income:              Prescription coverage:      [facility]      PSYCOLOGICAL ASSESSMENT  History of substance abuse:     None Disclosed  History of psychiatric issues:     None Disclosed  DISCHARGE RISKS/BARRIERS:     None  ANTICIPATED DISCHARGE DISPOSITION (home, shelter, tent, etc.)           Home  PATIENT GOALS        Patient’s #1 Goal is to access [place] Welfare benefits.     Task:  Pt will reach out to [place] State Welfare.            Patient’s #2 Goal is to address Food Insecurity.        Task:  Pt. Given [employer].    Task:  Pt. will reach out to community [employer].

## 2020-07-23 ENCOUNTER — TELEPHONE (OUTPATIENT)
Dept: MEDICAL GROUP | Facility: MEDICAL CENTER | Age: 55
End: 2020-07-23

## 2020-07-23 NOTE — TELEPHONE ENCOUNTER
Patient came is with sister Rachelle to  paper work around 2:15pm 7/22/20.  Rachelle is not a patient of [facility] and has demanded the staff and providers to help her apply for Medicaid and many other things.  Daxa Mcneil referred Rachelle to  Mary for assistance.  When they were in the office, Mary gave Rachelle instructions on how to apply for Medicaid, including phone numbers to the welfare office for assistance.  Rachelle became belligerent, started yelling at us, telling us that she can't do it and that we have to do it for her.  Mary and I both explained to her multiple times that we are unable to apply her for Medicaid and that she needs to do it herself and went over the 3 ways she can apply.  She kept interrupting us, not listening to what we had to say in order to help her.      After about 30 minutes of dealing with Rachelle and trying to get her to understand the process, she got extremely upset that we would not apply for her and started saying that we have to do this for her because she is disabled and under the ADA federal law we have to do this for her.  At this point, Patient became so frustrated with Rachelle that he grabbed his belongings and rushed out of the room.  I kindly told Rachelle again what she needs to do to apply for Medicaid and asked her to leave.      Patient came back and asked if we could patch up his elbow that was scrapped up.  I had one of my Medical Assistants clean the wound and patch up patient's elbow and they left the clinic shortly after.      Patient's sister continues to be belligerent towards staff and providers every time she comes with patient to his appointment and demanding we do a list of things for her.  Provider and staff have told her multiple times that this is not her appointment and we need her to be quite so we can care for her brother, but it continues to be an issue that we deal more with Rachelle then with the patient himself.

## 2020-07-27 ENCOUNTER — PATIENT OUTREACH (OUTPATIENT)
Dept: HEALTH INFORMATION MANAGEMENT | Facility: OTHER | Age: 55
End: 2020-07-27

## 2020-08-07 ENCOUNTER — PATIENT OUTREACH (OUTPATIENT)
Dept: HEALTH INFORMATION MANAGEMENT | Facility: OTHER | Age: 55
End: 2020-08-07

## 2020-08-10 ENCOUNTER — PHARMACY VISIT (OUTPATIENT)
Dept: PHARMACY | Facility: MEDICAL CENTER | Age: 55
End: 2020-08-10
Payer: COMMERCIAL

## 2020-08-10 ENCOUNTER — OFFICE VISIT (OUTPATIENT)
Dept: MEDICAL GROUP | Facility: MEDICAL CENTER | Age: 55
End: 2020-08-10
Attending: NURSE PRACTITIONER
Payer: MEDICAID

## 2020-08-10 VITALS
BODY MASS INDEX: 21.83 KG/M2 | HEART RATE: 84 BPM | SYSTOLIC BLOOD PRESSURE: 108 MMHG | OXYGEN SATURATION: 98 % | TEMPERATURE: 98.6 F | HEIGHT: 67 IN | WEIGHT: 139.1 LBS | DIASTOLIC BLOOD PRESSURE: 68 MMHG

## 2020-08-10 DIAGNOSIS — R11.0 NAUSEA: ICD-10-CM

## 2020-08-10 DIAGNOSIS — R10.84 GENERALIZED ABDOMINAL PAIN: ICD-10-CM

## 2020-08-10 DIAGNOSIS — R12 HEARTBURN: ICD-10-CM

## 2020-08-10 DIAGNOSIS — B20 HIV INFECTION, UNSPECIFIED SYMPTOM STATUS (HCC): ICD-10-CM
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
## 2020-08-10 DIAGNOSIS — J45.40 MODERATE PERSISTENT ASTHMA WITHOUT COMPLICATION: ICD-10-CM

## 2020-08-10 DIAGNOSIS — B00.9 HSV-2 (HERPES SIMPLEX VIRUS 2) INFECTION: ICD-10-CM

## 2020-08-10 PROCEDURE — RXMED WILLOW AMBULATORY MEDICATION CHARGE: Performed by: NURSE PRACTITIONER

## 2020-08-10 PROCEDURE — 99214 OFFICE O/P EST MOD 30 MIN: CPT | Performed by: NURSE PRACTITIONER

## 2020-08-10 PROCEDURE — 99213 OFFICE O/P EST LOW 20 MIN: CPT | Performed by: NURSE PRACTITIONER

## 2020-08-10 RX ORDER — DICYCLOMINE HYDROCHLORIDE 10 MG/1
10 CAPSULE ORAL 3 TIMES DAILY
Qty: 90 CAP | Refills: 3 | Status: SHIPPED | OUTPATIENT
Start: 2020-08-10

## 2020-08-10 RX ORDER — ABACAVIR SULFATE, DOLUTEGRAVIR SODIUM, LAMIVUDINE 600; 50; 300 MG/1; MG/1; MG/1
1 TABLET, FILM COATED ORAL DAILY
Qty: 30 TAB | Refills: 3 | Status: SHIPPED | OUTPATIENT
Start: 2020-08-10 | End: 2020-12-07 | Stop reason: SDUPTHER

## 2020-08-10 RX ORDER — LORATADINE 10 MG/1
10 TABLET ORAL DAILY
Qty: 30 TAB | Refills: 11 | Status: SHIPPED | OUTPATIENT
Start: 2020-08-10 | End: 2020-12-07 | Stop reason: SDUPTHER

## 2020-08-10 RX ORDER — ACYCLOVIR 400 MG/1
400 TABLET ORAL 2 TIMES DAILY
Qty: 60 TAB | Refills: 5 | Status: SHIPPED | OUTPATIENT
Start: 2020-08-10 | End: 2020-12-07 | Stop reason: SDUPTHER

## 2020-08-10 RX ORDER — LORATADINE 10 MG/1
TABLET ORAL
COMMUNITY
Start: 2020-07-13 | End: 2020-08-10 | Stop reason: SDUPTHER

## 2020-08-10 RX ORDER — DARUNAVIR ETHANOLATE AND COBICISTAT 800; 150 MG/1; MG/1
1 TABLET, FILM COATED ORAL DAILY
Qty: 30 TAB | Refills: 5 | Status: SHIPPED | OUTPATIENT
Start: 2020-08-10 | End: 2020-12-07 | Stop reason: SDUPTHER

## 2020-08-10 RX ORDER — PANTOPRAZOLE SODIUM 20 MG/1
20 TABLET, DELAYED RELEASE ORAL DAILY
Qty: 30 TAB | Refills: 3 | Status: SHIPPED | OUTPATIENT
Start: 2020-08-10 | End: 2020-12-07 | Stop reason: SDUPTHER

## 2020-08-10 RX ORDER — ONDANSETRON 4 MG/1
4 TABLET, FILM COATED ORAL EVERY 4 HOURS PRN
Qty: 20 TAB | Refills: 5 | Status: SHIPPED | OUTPATIENT
Start: 2020-08-10 | End: 2021-01-12 | Stop reason: SDUPTHER

## 2020-08-10 RX ORDER — TIOTROPIUM BROMIDE AND OLODATEROL 3.124; 2.736 UG/1; UG/1
2 SPRAY, METERED RESPIRATORY (INHALATION) DAILY
Qty: 4 G | Refills: 4 | Status: SHIPPED | OUTPATIENT
Start: 2020-08-10 | End: 2020-12-07 | Stop reason: SDUPTHER

## 2020-08-10 RX ORDER — ALBUTEROL SULFATE 90 UG/1
2 AEROSOL, METERED RESPIRATORY (INHALATION) EVERY 6 HOURS PRN
Qty: 6.7 G | Refills: 11 | Status: SHIPPED | OUTPATIENT
Start: 2020-08-10 | End: 2020-12-07 | Stop reason: SDUPTHER

## 2020-08-10 ASSESSMENT — ENCOUNTER SYMPTOMS
WHEEZING: 0
WEIGHT LOSS: 0
COUGH: 0
BLOOD IN STOOL: 0
DIARRHEA: 0
CONSTIPATION: 0
CHILLS: 0
FEVER: 0
ABDOMINAL PAIN: 0
PALPITATIONS: 0
SHORTNESS OF BREATH: 0
WEAKNESS: 1

## 2020-08-10 NOTE — PROGRESS NOTES
Chief Complaint   Patient presents with   • Medication Refill   • Follow-Up       Subjective:     HPI:   Paul Chamberlain is a 55 y.o. male here to discuss the evaluation and management of:        Falls  Patient reports ongoing weakness and unsteady gait.  He has not been able to establish with physical therapy due to lack of telephone.  Denies any falls.  He continues to use a front wheel walker and a bike helmet during ambulation.    Medication refill  Patient requesting refill of his medications.    ROS  Review of Systems   Constitutional: Positive for malaise/fatigue. Negative for chills, fever and weight loss.   Respiratory: Negative for cough, shortness of breath and wheezing.    Cardiovascular: Negative for chest pain, palpitations and leg swelling.   Gastrointestinal: Negative for abdominal pain, blood in stool, constipation and diarrhea.   Neurological: Positive for weakness.         Not on File    Current medicines (including changes today)  Current Outpatient Medications   Medication Sig Dispense Refill   • acyclovir (ZOVIRAX) 400 MG tablet Take 1 Tab by mouth 2 times a day. prophylactic dose 60 Tab 5   • loratadine (CLARITIN) 10 MG Tab Take 1 Tab by mouth every day. 30 Tab 11   • Darunavir-Cobicistat (PREZCOBIX) 800-150 MG Tab Take 1 Tab by mouth every day. 30 Tab 5   • Abacavir-Dolutegravir-Lamivud (TRIUMEQ) 600- MG Tab Take 1 Tab by mouth every day. 30 Tab 3   • albuterol 108 (90 Base) MCG/ACT Aero Soln inhalation aerosol Inhale 2 Puffs by mouth every 6 hours as needed for Shortness of Breath. 6.7 g 11   • Tiotropium Bromide-Olodaterol (STIOLTO RESPIMAT) 2.5-2.5 MCG/ACT Aero Soln Inhale 2 Inhalation by mouth every day. 4 g 4   • dicyclomine (BENTYL) 10 MG Cap Take 1 Cap by mouth 3 times a day. 90 Cap 3   • pantoprazole (PROTONIX) 20 MG tablet Take 1 Tab by mouth every day. 30 Tab 3   • ondansetron (ZOFRAN) 4 MG Tab tablet Take 1 Tab by mouth every four hours as needed for Nausea/Vomiting.  "20 Tab 5   • ergocalciferol (DRISDOL) 51824 UNIT capsule Take 1 Cap by mouth every 7 days. 12 Cap 1   • Misc. Devices Misc 1 Device by Does not apply route Q30 DAYS. Please provide the patient with 1 case of Ensure/boost monthly. 1 Device 0   • ibuprofen (MOTRIN) 800 MG Tab Take 1 Tab by mouth 3 times a day. 90 Tab 3   • acyclovir (ZOVIRAX) 800 MG Tab Take 1 Tab by mouth 3 times a day. With herpes outbreak 15 Tab 3     No current facility-administered medications for this visit.        Social History     Tobacco Use   • Smoking status: Never Smoker   • Smokeless tobacco: Current User     Types: Chew   Substance Use Topics   • Alcohol use: Not on file   • Drug use: Not on file       Patient Active Problem List    Diagnosis Date Noted   • Vitamin D deficiency 07/13/2020   • Falls 07/13/2020   • Palpitations 07/13/2020   • Hayfever 07/13/2020   • Encounter to establish care 06/17/2020   • HSV-2 (herpes simplex virus 2) infection 06/17/2020   • HIV infection (HCC) 06/17/2020   • Moderate persistent asthma without complication 06/17/2020       Family History   Problem Relation Age of Onset   • Cancer Mother         unknown type, ovarian CA   • Arthritis Mother         RA   • Cancer Father         prostate CA   • Asthma Father    • Arthritis Father         RA   • Cancer Sister         unknown   • Cancer Maternal Grandmother         unknown   • Arthritis Sister         RA          Objective:     /68 (BP Location: Right arm, Patient Position: Sitting, BP Cuff Size: Adult)   Pulse 84   Temp 37 °C (98.6 °F) (Temporal)   Ht 1.702 m (5' 7\")   Wt 63.1 kg (139 lb 1.6 oz)   SpO2 98%  Body mass index is 21.79 kg/m².    Physical Exam:  Physical Exam   Constitutional: He is oriented to person, place, and time. He appears malnourished. No distress.   HENT:   Head: Normocephalic.   Right Ear: Tympanic membrane and external ear normal.   Left Ear: Tympanic membrane and external ear normal.   Eyes: Pupils are equal, round, and " reactive to light. Conjunctivae and EOM are normal.   Neck: Normal range of motion. Neck supple. No tracheal deviation present.   Cardiovascular: Normal rate, regular rhythm, normal heart sounds and intact distal pulses.   Pulmonary/Chest: Effort normal and breath sounds normal.   Abdominal: Soft. Bowel sounds are normal.   Musculoskeletal: Normal range of motion.   Lymphadenopathy:        Head (right side): No preauricular adenopathy present.        Head (left side): No preauricular adenopathy present.     He has no cervical adenopathy.   Neurological: He is alert and oriented to person, place, and time. He has normal sensation, normal strength and intact cranial nerves. Gait normal.   Skin: Skin is warm and dry.   Psychiatric: Affect and judgment normal.       Assessment and Plan:     The following treatment plan was discussed:    1. Fall, initial encounter  -chronic problem, unstable.  Patient has not scheduled with physical therapy for strengthening as well as wheelchair evaluation.  We will get the appointment scheduled for him and let him know at his sister's visit next week as he does not have a phone for medication.  ER precautions reviewed.   2. HSV-2 (herpes simplex virus 2) infection  acyclovir (ZOVIRAX) 400 MG tablet  -Chronic problem, stable.  Medication refill.   3. HIV infection, unspecified symptom status (HCC)  Darunavir-Cobicistat (PREZCOBIX) 800-150 MG Tab    Abacavir-Dolutegravir-Lamivud (TRIUMEQ) 600- MG Tab  -Chronic problem, unclear stability.  Medication refill.  I discussed with the patient again the importance of establishing with infectious disease at [facility].  Patient verbalized that he would do so.   4. Moderate persistent asthma without complication  loratadine (CLARITIN) 10 MG Tab    albuterol 108 (90 Base) MCG/ACT Aero Soln inhalation aerosol    Tiotropium Bromide-Olodaterol (STIOLTO RESPIMAT) 2.5-2.5 MCG/ACT Aero Soln  -Chronic problem, improving.  Medication refill.   5.  Generalized abdominal pain  dicyclomine (BENTYL) 10 MG Cap  -Chronic problem, stable.  Medication refill.   6. Heartburn  pantoprazole (PROTONIX) 20 MG tablet  -Chronic problem, stable.  Medication refill   7. Nausea  ondansetron (ZOFRAN) 4 MG Tab tablet  -Chronic problem, stable.  Medication refill.       Any change or worsening of signs or symptoms, patient encouraged to follow-up or report to emergency room for further evaluation. Patient verbalizes understanding and agrees.    Follow-Up: Return in about 2 months (around 10/10/2020) for Routine follow up, Long.      PLEASE NOTE: This dictation was created using voice recognition software. I have made every reasonable attempt to correct obvious errors, but I expect that there are errors of grammar and possibly content that I did not discover before finalizing the note.

## 2020-08-10 NOTE — ASSESSMENT & PLAN NOTE
Patient reports ongoing weakness and unsteady gait.  He has not been able to establish with physical therapy due to lack of telephone.  Denies any falls.  He continues to use a front wheel walker and a bike helmet during ambulation.

## 2020-08-12 ENCOUNTER — PHARMACY VISIT (OUTPATIENT)
Dept: PHARMACY | Facility: MEDICAL CENTER | Age: 55
End: 2020-08-12
Payer: COMMERCIAL

## 2020-08-18 ENCOUNTER — PHARMACY VISIT (OUTPATIENT)
Dept: PHARMACY | Facility: MEDICAL CENTER | Age: 55
End: 2020-08-18
Payer: COMMERCIAL

## 2020-08-18 PROCEDURE — RXMED WILLOW AMBULATORY MEDICATION CHARGE: Performed by: NURSE PRACTITIONER

## 2020-08-18 RX ORDER — ONDANSETRON 4 MG/1
TABLET, FILM COATED ORAL
Qty: 20 TAB | Refills: 0 | Status: SHIPPED | OUTPATIENT
Start: 2020-07-13 | End: 2021-01-18

## 2020-08-26 NOTE — PROGRESS NOTES
8/7/2020 Incoming message from PT  requesting assistance in contacting patient as CCM SW and CHW have previously worked with patient. CCM RN tc to phone number listed under patient's EC Rachelle's name. Woman on other end initially said they are not available at this number. Asked if she could pass on a message to patient and she called patient over to speak. Apparently, this is the neighbor's phone, she requested her number be removed from patient's contact list.     CCM RN spoke with patient and sister Rachelle regarding need to schedule for wheelchair assessment with PT. Patient states he got a letter that had the number and he will call it later. Rachelle asked if this can be addressed at PCP appointment on Monday. They expressed lack of transportation. MTM ride scheduled for PCP appointment. With no contact number, they will plan to be in front of their building at the approximate time of pickup.     Encouraged patient and sister to ask PCP or reach out to CCM if additional needs in the future.

## 2020-09-14 ENCOUNTER — PHARMACY VISIT (OUTPATIENT)
Dept: PHARMACY | Facility: MEDICAL CENTER | Age: 55
End: 2020-09-14
Payer: COMMERCIAL

## 2020-09-14 PROCEDURE — RXMED WILLOW AMBULATORY MEDICATION CHARGE: Performed by: NURSE PRACTITIONER

## 2020-09-15 DIAGNOSIS — R11.0 NAUSEA: ICD-10-CM

## 2020-09-15 PROCEDURE — RXMED WILLOW AMBULATORY MEDICATION CHARGE: Performed by: NURSE PRACTITIONER

## 2020-09-15 RX ORDER — ONDANSETRON 4 MG/1
4 TABLET, ORALLY DISINTEGRATING ORAL EVERY 6 HOURS PRN
Qty: 20 TAB | Refills: 5 | Status: SHIPPED | OUTPATIENT
Start: 2020-09-15 | End: 2021-01-18

## 2020-09-17 ENCOUNTER — PHARMACY VISIT (OUTPATIENT)
Dept: PHARMACY | Facility: MEDICAL CENTER | Age: 55
End: 2020-09-17
Payer: COMMERCIAL

## 2020-12-07 ENCOUNTER — PHARMACY VISIT (OUTPATIENT)
Dept: PHARMACY | Facility: MEDICAL CENTER | Age: 55
End: 2020-12-07
Payer: COMMERCIAL

## 2020-12-07 DIAGNOSIS — G89.29 OTHER CHRONIC PAIN: ICD-10-CM

## 2020-12-07 PROCEDURE — RXMED WILLOW AMBULATORY MEDICATION CHARGE: Performed by: NURSE PRACTITIONER

## 2020-12-07 RX ORDER — IBUPROFEN 800 MG/1
800 TABLET ORAL 3 TIMES DAILY
Qty: 90 TAB | Refills: 11 | Status: SHIPPED | OUTPATIENT
Start: 2020-12-07 | End: 2021-03-03

## 2020-12-07 RX ORDER — LORATADINE 10 MG/1
TABLET ORAL
Qty: 30 TAB | Refills: 11 | Status: SHIPPED | OUTPATIENT
Start: 2020-07-13 | End: 2021-02-24 | Stop reason: SDUPTHER

## 2020-12-07 RX ORDER — ALBUTEROL SULFATE 90 UG/1
AEROSOL, METERED RESPIRATORY (INHALATION)
Qty: 6.7 G | Refills: 11 | Status: SHIPPED | OUTPATIENT
Start: 2020-06-17 | End: 2021-02-24 | Stop reason: SDUPTHER

## 2020-12-07 RX ORDER — DICYCLOMINE HYDROCHLORIDE 10 MG/1
CAPSULE ORAL
Qty: 90 CAP | Refills: 3 | Status: SHIPPED | OUTPATIENT
Start: 2020-06-17 | End: 2021-02-24

## 2020-12-07 RX ORDER — PANTOPRAZOLE SODIUM 20 MG/1
TABLET, DELAYED RELEASE ORAL
Qty: 30 TAB | Refills: 3 | Status: SHIPPED | OUTPATIENT
Start: 2020-06-17 | End: 2021-02-24 | Stop reason: SDUPTHER

## 2020-12-07 RX ORDER — TIOTROPIUM BROMIDE AND OLODATEROL 3.124; 2.736 UG/1; UG/1
SPRAY, METERED RESPIRATORY (INHALATION)
Qty: 4 G | Refills: 5 | Status: SHIPPED | OUTPATIENT
Start: 2020-06-17 | End: 2021-02-24 | Stop reason: SDUPTHER

## 2020-12-07 RX ORDER — ABACAVIR SULFATE, DOLUTEGRAVIR SODIUM, LAMIVUDINE 600; 50; 300 MG/1; MG/1; MG/1
TABLET, FILM COATED ORAL
Qty: 30 TAB | Refills: 3 | Status: SHIPPED | OUTPATIENT
Start: 2020-06-17 | End: 2021-02-24 | Stop reason: SDUPTHER

## 2020-12-07 RX ORDER — ERGOCALCIFEROL 1.25 MG/1
CAPSULE ORAL
Qty: 12 CAP | Refills: 1 | Status: SHIPPED | OUTPATIENT
Start: 2020-07-13 | End: 2021-02-16 | Stop reason: SDUPTHER

## 2020-12-07 RX ORDER — ACYCLOVIR 400 MG/1
TABLET ORAL
Qty: 60 TAB | Refills: 5 | Status: SHIPPED | OUTPATIENT
Start: 2020-07-13 | End: 2021-02-24 | Stop reason: SDUPTHER

## 2020-12-07 RX ORDER — DARUNAVIR ETHANOLATE AND COBICISTAT 800; 150 MG/1; MG/1
TABLET, FILM COATED ORAL
Qty: 30 TAB | Refills: 5 | Status: SHIPPED | OUTPATIENT
Start: 2020-06-17 | End: 2021-02-24 | Stop reason: SDUPTHER

## 2020-12-08 ENCOUNTER — PHARMACY VISIT (OUTPATIENT)
Dept: PHARMACY | Facility: MEDICAL CENTER | Age: 55
End: 2020-12-08
Payer: COMMERCIAL

## 2021-01-12 ENCOUNTER — PHARMACY VISIT (OUTPATIENT)
Dept: PHARMACY | Facility: MEDICAL CENTER | Age: 56
End: 2021-01-12
Payer: COMMERCIAL

## 2021-01-12 DIAGNOSIS — R11.0 NAUSEA: ICD-10-CM

## 2021-01-12 PROCEDURE — RXMED WILLOW AMBULATORY MEDICATION CHARGE: Performed by: NURSE PRACTITIONER

## 2021-01-18 RX ORDER — ONDANSETRON 4 MG/1
4 TABLET, FILM COATED ORAL EVERY 4 HOURS PRN
Qty: 20 TAB | Refills: 11 | Status: SHIPPED | OUTPATIENT
Start: 2021-01-18 | End: 2021-02-24 | Stop reason: SDUPTHER

## 2021-02-16 ENCOUNTER — PHARMACY VISIT (OUTPATIENT)
Dept: PHARMACY | Facility: MEDICAL CENTER | Age: 56
End: 2021-02-16
Payer: COMMERCIAL

## 2021-02-16 DIAGNOSIS — E55.9 VITAMIN D DEFICIENCY: ICD-10-CM

## 2021-02-16 PROCEDURE — RXMED WILLOW AMBULATORY MEDICATION CHARGE: Performed by: NURSE PRACTITIONER

## 2021-02-17 PROCEDURE — RXMED WILLOW AMBULATORY MEDICATION CHARGE: Performed by: NURSE PRACTITIONER

## 2021-02-17 RX ORDER — ERGOCALCIFEROL 1.25 MG/1
50000 CAPSULE ORAL
Qty: 3 CAPSULE | Refills: 0 | Status: SHIPPED | OUTPATIENT
Start: 2021-02-17 | End: 2021-06-11 | Stop reason: SDUPTHER

## 2021-02-19 ENCOUNTER — PHARMACY VISIT (OUTPATIENT)
Dept: PHARMACY | Facility: MEDICAL CENTER | Age: 56
End: 2021-02-19
Payer: COMMERCIAL

## 2021-02-19 PROCEDURE — RXMED WILLOW AMBULATORY MEDICATION CHARGE: Performed by: NURSE PRACTITIONER

## 2021-02-24 ENCOUNTER — OFFICE VISIT (OUTPATIENT)
Dept: MEDICAL GROUP | Facility: MEDICAL CENTER | Age: 56
End: 2021-02-24
Attending: NURSE PRACTITIONER
Payer: MEDICAID

## 2021-02-24 ENCOUNTER — PATIENT OUTREACH (OUTPATIENT)
Dept: HEALTH INFORMATION MANAGEMENT | Facility: OTHER | Age: 56
End: 2021-02-24

## 2021-02-24 VITALS
SYSTOLIC BLOOD PRESSURE: 104 MMHG | RESPIRATION RATE: 16 BRPM | HEIGHT: 67 IN | OXYGEN SATURATION: 97 % | TEMPERATURE: 97 F | BODY MASS INDEX: 24.06 KG/M2 | WEIGHT: 153.3 LBS | DIASTOLIC BLOOD PRESSURE: 68 MMHG | HEART RATE: 87 BPM

## 2021-02-24 DIAGNOSIS — J45.40 MODERATE PERSISTENT ASTHMA WITHOUT COMPLICATION: ICD-10-CM

## 2021-02-24 DIAGNOSIS — J30.2 SEASONAL ALLERGIES: ICD-10-CM

## 2021-02-24 DIAGNOSIS — R11.0 NAUSEA: ICD-10-CM

## 2021-02-24 DIAGNOSIS — Z13.228 SCREENING FOR METABOLIC DISORDER: ICD-10-CM

## 2021-02-24 DIAGNOSIS — R21 RASH: ICD-10-CM

## 2021-02-24 DIAGNOSIS — Z13.6 SCREENING FOR CARDIOVASCULAR CONDITION: ICD-10-CM

## 2021-02-24 DIAGNOSIS — Z13.21 ENCOUNTER FOR VITAMIN DEFICIENCY SCREENING: ICD-10-CM

## 2021-02-24 DIAGNOSIS — R20.0 NUMBNESS: ICD-10-CM

## 2021-02-24 DIAGNOSIS — K21.9 GASTROESOPHAGEAL REFLUX DISEASE, UNSPECIFIED WHETHER ESOPHAGITIS PRESENT: ICD-10-CM

## 2021-02-24 DIAGNOSIS — B20 HIV INFECTION, UNSPECIFIED SYMPTOM STATUS (HCC): ICD-10-CM

## 2021-02-24 DIAGNOSIS — R06.02 SOB (SHORTNESS OF BREATH): ICD-10-CM

## 2021-02-24 PROCEDURE — 99212 OFFICE O/P EST SF 10 MIN: CPT | Performed by: NURSE PRACTITIONER

## 2021-02-24 PROCEDURE — RXMED WILLOW AMBULATORY MEDICATION CHARGE: Performed by: NURSE PRACTITIONER

## 2021-02-24 PROCEDURE — 99213 OFFICE O/P EST LOW 20 MIN: CPT | Performed by: NURSE PRACTITIONER

## 2021-02-24 RX ORDER — ALBUTEROL SULFATE 90 UG/1
AEROSOL, METERED RESPIRATORY (INHALATION)
Qty: 6.7 G | Refills: 11 | Status: SHIPPED | OUTPATIENT
Start: 2021-02-24 | End: 2022-02-24

## 2021-02-24 RX ORDER — ONDANSETRON 4 MG/1
4 TABLET, FILM COATED ORAL EVERY 4 HOURS PRN
Qty: 20 TABLET | Refills: 11 | Status: SHIPPED | OUTPATIENT
Start: 2021-02-24 | End: 2021-03-03

## 2021-02-24 RX ORDER — PANTOPRAZOLE SODIUM 20 MG/1
TABLET, DELAYED RELEASE ORAL
Qty: 30 TABLET | Refills: 3 | Status: SHIPPED | OUTPATIENT
Start: 2021-02-24 | End: 2022-02-24

## 2021-02-24 RX ORDER — ACYCLOVIR 400 MG/1
TABLET ORAL
Qty: 60 TABLET | Refills: 5 | Status: SHIPPED | OUTPATIENT
Start: 2021-02-24 | End: 2022-02-24

## 2021-02-24 RX ORDER — PERMETHRIN 50 MG/G
1 CREAM TOPICAL ONCE
Qty: 60 G | Refills: 0 | Status: SHIPPED | OUTPATIENT
Start: 2021-02-24 | End: 2021-03-04

## 2021-02-24 RX ORDER — TIOTROPIUM BROMIDE AND OLODATEROL 3.124; 2.736 UG/1; UG/1
SPRAY, METERED RESPIRATORY (INHALATION)
Qty: 4 G | Refills: 5 | Status: SHIPPED | OUTPATIENT
Start: 2021-02-24 | End: 2022-02-24

## 2021-02-24 RX ORDER — ABACAVIR SULFATE, DOLUTEGRAVIR SODIUM, LAMIVUDINE 600; 50; 300 MG/1; MG/1; MG/1
TABLET, FILM COATED ORAL
Qty: 30 TABLET | Refills: 3 | Status: SHIPPED | OUTPATIENT
Start: 2021-02-24 | End: 2022-02-24

## 2021-02-24 RX ORDER — LORATADINE 10 MG/1
10 TABLET, ORALLY DISINTEGRATING ORAL DAILY
Qty: 30 TABLET | Refills: 11 | Status: SHIPPED | OUTPATIENT
Start: 2021-02-24

## 2021-02-24 RX ORDER — DARUNAVIR ETHANOLATE AND COBICISTAT 800; 150 MG/1; MG/1
TABLET, FILM COATED ORAL
Qty: 30 TABLET | Refills: 5 | Status: SHIPPED | OUTPATIENT
Start: 2021-02-24 | End: 2022-02-24

## 2021-02-24 ASSESSMENT — ENCOUNTER SYMPTOMS
CHILLS: 0
FEVER: 0
DIARRHEA: 0
WEIGHT LOSS: 0
CONSTIPATION: 0
SHORTNESS OF BREATH: 0
BLOOD IN STOOL: 0
WHEEZING: 0
ABDOMINAL PAIN: 0
COUGH: 0
PALPITATIONS: 0

## 2021-02-25 ENCOUNTER — PATIENT OUTREACH (OUTPATIENT)
Dept: HEALTH INFORMATION MANAGEMENT | Facility: OTHER | Age: 56
End: 2021-02-25

## 2021-02-25 NOTE — ASSESSMENT & PLAN NOTE
"Present for years.  Present in both feet and finger tips.  He has not had treatment in the past.  He is not willing to try \"psychotropic medications\".  He denies pain, just numbness.  He reports he has a family hx of MS.   "

## 2021-02-25 NOTE — ASSESSMENT & PLAN NOTE
"Patient reports that he developed a rash after interacting with a homeless individual.  He reports that his sister got the same rash.  He describes bugs that are canoe shaped with 6 legs coming out of the center.  He describes the rash as maculopapular and itchy.  He denies fever.  He denies drainage, redness, and edema on the rash site.  He reports that his sister took shower last night and her rash seems to have resolved, his remains.  He has not tried any treatment.  He has seen bugs in his home, he describes them as canoe shaped with 6 legs coming out of the center of their body.  He thinks they are \"super lice\".  "

## 2021-02-25 NOTE — PROGRESS NOTES
2/24/21 Met patient in [facility] clinic after PCP appointment, referred by sister who was referred to [facility] staff as well. This CCM RN outreached to patient last year after assistance requested from PT  to schedule WC fitting-able to make contact x1 but no additional successful outreach. Patient's sister states they are both in need of assistance accessing community resources: transportation, in home homemaker services, DME (new wheelchair, incontinence supplies), phone services. Provided bus pass for patient to return to [facility] to meet with [facility] staff tomorrow, 2/25/21 at 3 pm to initiate community resource applications.

## 2021-02-25 NOTE — PROGRESS NOTES
"Chief Complaint   Patient presents with   • Rash     neck and back   • Other     requestion medication   • Other     disability bus pass       Subjective:     HPI:   Paul Chamberlain is a 55 y.o. male here to discuss the evaluation and management of:        Numbness  Present for years.  Present in both feet and finger tips.  He has not had treatment in the past.  He is not willing to try \"psychotropic medications\".  He denies pain, just numbness.  He reports he has a family hx of MS.     Rash  Patient reports that he developed a rash after interacting with a homeless individual.  He reports that his sister got the same rash.  He describes bugs that are canoe shaped with 6 legs coming out of the center.  He describes the rash as maculopapular and itchy.  He denies fever.  He denies drainage, redness, and edema on the rash site.  He reports that his sister took shower last night and her rash seems to have resolved, his remains.  He has not tried any treatment.  He has seen bugs in his home, he describes them as canoe shaped with 6 legs coming out of the center of their body.  He thinks they are \"super lice\".      ROS  Review of Systems   Constitutional: Negative for chills, fever, malaise/fatigue and weight loss.   Respiratory: Negative for cough, shortness of breath and wheezing.    Cardiovascular: Negative for chest pain, palpitations and leg swelling.   Gastrointestinal: Negative for abdominal pain, blood in stool, constipation and diarrhea.   Skin: Positive for itching and rash.         Allergies   Allergen Reactions   • Ceprotin [Protein C Concentrate, Human]      vomiting       Current medicines (including changes today)  Current Outpatient Medications   Medication Sig Dispense Refill   • permethrin (ELIMITE) 5 % Cream Apply topically to affected area one time for 1 dose. Apply to skin from neck down, leave in place for 8 hours then shower off. 60 g 0   • albuterol (PROVENTIL HFA) 108 (90 Base) MCG/ACT " Aero Soln inhalation aerosol Inhale 2 puffs by mouth every 6 hours as needed for shortness of breath. 6.7 g 11   • acyclovir (ZOVIRAX) 400 MG tablet Take 1 tablet by mouth 2 times a day. 60 tablet 5   • pantoprazole (PROTONIX) 20 MG tablet Take 1 tablet by mouth daily. 30 tablet 3   • Tiotropium Bromide-Olodaterol (STIOLTO RESPIMAT) 2.5-2.5 MCG/ACT Aero Soln Inhale 2 puffs by mouth daily. 4 g 5   • ondansetron (ZOFRAN) 4 MG Tab tablet Take 1 Tablet  by mouth every four hours as needed for Nausea/Vomiting. 20 tablet 11   • loratadine (CLARITIN REDITABS) 10 MG dissolvable tablet Take 1 tablet by mouth every day. 30 tablet 11   • Darunavir-Cobicistat (PREZCOBIX) 800-150 MG Tab Take 1 tablet by mouth daily. 30 tablet 5   • Abacavir-Dolutegravir-Lamivud (TRIUMEQ) 600- MG Tab Take 1 tablet by mouth daily. 30 tablet 3   • vitamin D, Ergocalciferol, (DRISDOL) 1.25 MG (66942 UT) Cap capsule Take 1 capsule by mouth every 28 days. 3 capsule 0   • ibuprofen (MOTRIN) 800 MG Tab Take 1 Tab by mouth 3 times a day. 90 Tab 11   • dicyclomine (BENTYL) 10 MG Cap Take 1 Cap by mouth 3 times a day. 90 Cap 3   • Misc. Devices Misc 1 Device by Does not apply route Q30 DAYS. Please provide the patient with 1 case of Ensure/boost monthly. 1 Device 0   • acyclovir (ZOVIRAX) 800 MG Tab Take 1 Tab by mouth 3 times a day. With herpes outbreak 15 Tab 3     No current facility-administered medications for this visit.       Social History     Tobacco Use   • Smoking status: Never Smoker   • Smokeless tobacco: Current User     Types: Chew   Substance Use Topics   • Alcohol use: Not on file   • Drug use: Not on file       Patient Active Problem List    Diagnosis Date Noted   • Numbness 02/24/2021   • Rash 02/24/2021   • Vitamin D deficiency 07/13/2020   • Falls 07/13/2020   • Palpitations 07/13/2020   • Hayfever 07/13/2020   • Encounter to establish care 06/17/2020   • HSV-2 (herpes simplex virus 2) infection 06/17/2020   • HIV infection (HCC)  "06/17/2020   • Moderate persistent asthma without complication 06/17/2020       Family History   Problem Relation Age of Onset   • Cancer Mother         unknown type, ovarian CA   • Arthritis Mother         RA   • Cancer Father         prostate CA   • Asthma Father    • Arthritis Father         RA   • Cancer Sister         unknown   • Cancer Maternal Grandmother         unknown   • Arthritis Sister         RA          Objective:     /68 (BP Location: Left arm, Patient Position: Sitting, BP Cuff Size: Adult)   Pulse 87   Temp 36.1 °C (97 °F) (Temporal)   Resp 16   Ht 1.702 m (5' 7\")   Wt 69.5 kg (153 lb 4.8 oz)   SpO2 97%  Body mass index is 24.01 kg/m².    Physical Exam:  Physical Exam   Constitutional: He is oriented to person, place, and time. He appears malnourished. He appears cachectic.  Non-toxic appearance. No distress.   HENT:   Head: Normocephalic.   Right Ear: Tympanic membrane and external ear normal.   Left Ear: Tympanic membrane and external ear normal.   Eyes: Pupils are equal, round, and reactive to light. Conjunctivae and EOM are normal.   Neck: No tracheal deviation present.   Cardiovascular: Normal rate, regular rhythm, normal heart sounds and intact distal pulses.   Pulmonary/Chest: Effort normal and breath sounds normal.   Abdominal: Soft. Bowel sounds are normal.   Musculoskeletal:         General: Normal range of motion.      Cervical back: Normal range of motion and neck supple.   Lymphadenopathy:        Head (right side): No preauricular adenopathy present.        Head (left side): No preauricular adenopathy present.     He has no cervical adenopathy.   Neurological: He is alert and oriented to person, place, and time. He has normal sensation, normal strength and intact cranial nerves. Gait normal.   Skin: Skin is warm and dry. Rash noted. Rash is maculopapular.   Signs of scratching, no infected lesions noted   Psychiatric: Affect and judgment normal.       Assessment and Plan: "     The following treatment plan was discussed:    1. Numbness  REFERRAL TO NEUROLOGY  -Chronic problem, unstable.  Referral to neurology for further evaluation and treatment   2. Rash  permethrin (ELIMITE) 5 % Cream  -New problem, unstable.  Suspect infestation.    3. Nausea  pantoprazole (PROTONIX) 20 MG tablet    ondansetron (ZOFRAN) 4 MG Tab tablet  -Chronic problem, stable.  Medication refill.   4. HIV infection, unspecified symptom status (HCC)  acyclovir (ZOVIRAX) 400 MG tablet    Darunavir-Cobicistat (PREZCOBIX) 800-150 MG Tab    Abacavir-Dolutegravir-Lamivud (TRIUMEQ) 600- MG Tab    REFERRAL TO INFECTIOUS DISEASE  -Chronic problem, stable.  Medication refill.   5. Moderate persistent asthma without complication  albuterol (PROVENTIL HFA) 108 (90 Base) MCG/ACT Aero Soln inhalation aerosol    Tiotropium Bromide-Olodaterol (STIOLTO RESPIMAT) 2.5-2.5 MCG/ACT Aero Soln  -Chronic problem, stable.  Medication refill.   6. SOB (shortness of breath)     7. Seasonal allergies  loratadine (CLARITIN REDITABS) 10 MG dissolvable tablet  -Chronic problem, stable.  Medication refill.   8. Gastroesophageal reflux disease, unspecified whether esophagitis present  pantoprazole (PROTONIX) 20 MG tablet  -Chronic problem, stable.  Medication refill.   9. Screening for metabolic disorder  HEMOGLOBIN A1C    TSH WITH REFLEX TO FT4    Comp Metabolic Panel   10. Screening for cardiovascular condition  Lipid Profile   11. Encounter for vitamin deficiency screening  VITAMIN D,25 HYDROXY       Any change or worsening of signs or symptoms, patient encouraged to follow-up or report to emergency room for further evaluation. Patient verbalizes understanding and agrees.    Follow-Up: Return for TBD by lab results or sooner as needed.      PLEASE NOTE: This dictation was created using voice recognition software. I have made every reasonable attempt to correct obvious errors, but I expect that there are errors of grammar and possibly  content that I did not discover before finalizing the note.

## 2021-02-26 NOTE — PROGRESS NOTES
2/25/2021Patient and sister met CCM staff at [facility] patient education room to complete paperwork for Aging and Disability Services referral, Care Chest application, RTC access application. Attempted to get set up with [employer] phone service-went over different companies that provided the SIM card, patient and sister did not want to provide SSN, will look for one that does not require this. Patient reports he fell yesterday at home and hit his face on a chair, some swelling noted to lips and slight bruising under eye. Will request orders from PCP for Care Chest for additional mobility devices to promote safe ambulation/mobility: new 4ww/rollator, manual wheelchair, power scooter (usually has a waitlist), incontinence supplies, nutritional supplements--patient's preference is Ensure Complete. Provided bus passes to meet again next week to continue working on plan of care.       1605-Patient Fall  Patient and his sister met with Community Care Management staff in patient education room on 2nd floor of [facility] ([address]). Patient uses 4ww to ambulate and communicated hx of poor balance and frequent falls, most recently yesterday at home. Patient was meeting with staff to complete applications for community resources, including Care Chest for wheelchair and power scooter. Patient and sister were being escorted back to elevator at end of meeting, one CCM staff by elevator, one behind patient's sister with sister in wheelchair between patient and staff. Patient left 4ww in hallway and went into separate non-patient care room by self to weigh self on standing scale that did not have any hand holds. Patient fell unassisted onto buttocks and back. Patient did have helmet in place. Patient denies any injury or pain at time of fall. CCM RN immediately notified [facility] Director who notified [facility] staff. One of the [facility] providers was available to provide brief assessment of patient. 2 MA's from clinic assisted patient up from  floor and escorted him to the [facility] clinic for provider assessment.

## 2021-03-03 ENCOUNTER — HOSPITAL ENCOUNTER (EMERGENCY)
Facility: MEDICAL CENTER | Age: 56
End: 2021-03-03
Attending: EMERGENCY MEDICINE | Admitting: EMERGENCY MEDICINE
Payer: MEDICAID

## 2021-03-03 ENCOUNTER — OFFICE VISIT (OUTPATIENT)
Dept: MEDICAL GROUP | Facility: MEDICAL CENTER | Age: 56
End: 2021-03-03
Attending: NURSE PRACTITIONER
Payer: MEDICAID

## 2021-03-03 ENCOUNTER — PATIENT OUTREACH (OUTPATIENT)
Dept: HEALTH INFORMATION MANAGEMENT | Facility: OTHER | Age: 56
End: 2021-03-03

## 2021-03-03 ENCOUNTER — PHARMACY VISIT (OUTPATIENT)
Dept: PHARMACY | Facility: MEDICAL CENTER | Age: 56
End: 2021-03-03
Payer: COMMERCIAL

## 2021-03-03 VITALS
HEIGHT: 67 IN | HEART RATE: 80 BPM | WEIGHT: 131.61 LBS | SYSTOLIC BLOOD PRESSURE: 123 MMHG | BODY MASS INDEX: 20.66 KG/M2 | OXYGEN SATURATION: 96 % | TEMPERATURE: 98.6 F | DIASTOLIC BLOOD PRESSURE: 82 MMHG | RESPIRATION RATE: 16 BRPM

## 2021-03-03 VITALS
SYSTOLIC BLOOD PRESSURE: 96 MMHG | TEMPERATURE: 98 F | HEART RATE: 88 BPM | WEIGHT: 131.7 LBS | HEIGHT: 67 IN | RESPIRATION RATE: 16 BRPM | BODY MASS INDEX: 20.67 KG/M2 | DIASTOLIC BLOOD PRESSURE: 88 MMHG | OXYGEN SATURATION: 97 %

## 2021-03-03 DIAGNOSIS — K04.7 DENTAL INFECTION: ICD-10-CM
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
## 2021-03-03 DIAGNOSIS — N39.498 OTHER URINARY INCONTINENCE: ICD-10-CM

## 2021-03-03 DIAGNOSIS — K08.89 PAIN, DENTAL: ICD-10-CM

## 2021-03-03 DIAGNOSIS — R64 CACHEXIA (HCC): ICD-10-CM

## 2021-03-03 DIAGNOSIS — R11.0 NAUSEA: ICD-10-CM

## 2021-03-03 PROCEDURE — 99214 OFFICE O/P EST MOD 30 MIN: CPT | Performed by: NURSE PRACTITIONER

## 2021-03-03 PROCEDURE — 99212 OFFICE O/P EST SF 10 MIN: CPT | Performed by: NURSE PRACTITIONER

## 2021-03-03 PROCEDURE — RXMED WILLOW AMBULATORY MEDICATION CHARGE: Performed by: NURSE PRACTITIONER

## 2021-03-03 PROCEDURE — A9270 NON-COVERED ITEM OR SERVICE: HCPCS | Performed by: EMERGENCY MEDICINE

## 2021-03-03 PROCEDURE — 700102 HCHG RX REV CODE 250 W/ 637 OVERRIDE(OP): Performed by: EMERGENCY MEDICINE

## 2021-03-03 PROCEDURE — 99283 EMERGENCY DEPT VISIT LOW MDM: CPT

## 2021-03-03 RX ORDER — NAPROXEN 500 MG/1
500 TABLET ORAL ONCE
Status: COMPLETED | OUTPATIENT
Start: 2021-03-03 | End: 2021-03-03

## 2021-03-03 RX ORDER — PENICILLIN V POTASSIUM 500 MG/1
500 TABLET ORAL 4 TIMES DAILY
Qty: 40 TABLET | Refills: 0 | Status: SHIPPED | OUTPATIENT
Start: 2021-03-03 | End: 2021-03-13

## 2021-03-03 RX ORDER — PENICILLIN V POTASSIUM 500 MG/1
500 TABLET ORAL ONCE
Status: COMPLETED | OUTPATIENT
Start: 2021-03-03 | End: 2021-03-03

## 2021-03-03 RX ORDER — NAPROXEN 500 MG/1
500 TABLET ORAL 2 TIMES DAILY WITH MEALS
Qty: 20 TABLET | Refills: 0 | Status: SHIPPED | OUTPATIENT
Start: 2021-03-03 | End: 2021-06-15

## 2021-03-03 RX ORDER — ONDANSETRON 4 MG/1
4 TABLET, ORALLY DISINTEGRATING ORAL EVERY 6 HOURS PRN
Qty: 20 TABLET | Refills: 11 | Status: SHIPPED | OUTPATIENT
Start: 2021-03-03

## 2021-03-03 RX ADMIN — PENICILLIN V POTASSIUM 500 MG: 500 TABLET, FILM COATED ORAL at 19:09

## 2021-03-03 RX ADMIN — NAPROXEN 500 MG: 500 TABLET ORAL at 19:01

## 2021-03-03 ASSESSMENT — ENCOUNTER SYMPTOMS
ABDOMINAL PAIN: 0
PALPITATIONS: 0
WEIGHT LOSS: 0
CHILLS: 0
CONSTIPATION: 0
WHEEZING: 0
DIARRHEA: 0
SHORTNESS OF BREATH: 0
FEVER: 0
COUGH: 0
BLOOD IN STOOL: 0

## 2021-03-03 NOTE — TELEPHONE ENCOUNTER
Thank you so much, I am sending that stuff up to care chest.  I seen today for follow-up after his fall. Thanks!!

## 2021-03-03 NOTE — PROGRESS NOTES
Orders placed for Ensure, briefs, wheelchair, and power wheelchair sent to care chest.      Electronic prescriptions would not print, written prescription sent to care chest

## 2021-03-04 ENCOUNTER — DOCUMENTATION (OUTPATIENT)
Dept: HEALTH INFORMATION MANAGEMENT | Facility: OTHER | Age: 56
End: 2021-03-04

## 2021-03-04 ENCOUNTER — PHARMACY VISIT (OUTPATIENT)
Dept: PHARMACY | Facility: MEDICAL CENTER | Age: 56
End: 2021-03-04
Payer: COMMERCIAL

## 2021-03-04 PROCEDURE — RXMED WILLOW AMBULATORY MEDICATION CHARGE: Performed by: EMERGENCY MEDICINE

## 2021-03-04 RX ORDER — NAPROXEN 500 MG/1
500 TABLET ORAL 2 TIMES DAILY WITH MEALS
Qty: 20 TABLET | Refills: 0 | Status: SHIPPED | OUTPATIENT
Start: 2021-03-03 | End: 2021-06-15

## 2021-03-04 RX ORDER — PENICILLIN V POTASSIUM 500 MG/1
500 TABLET ORAL 4 TIMES DAILY
Qty: 40 TABLET | Refills: 0 | OUTPATIENT
Start: 2021-03-04 | End: 2021-03-14

## 2021-03-04 NOTE — ED NOTES
Medicated per orders, given discharge instructions, verbalized understanding, left with all belongings.

## 2021-03-04 NOTE — DISCHARGE INSTRUCTIONS
Use the contact information provided here to schedule an appointment with Dr. Mosher, oral surgery.  In the meantime, use the medications that we have prescribed.

## 2021-03-04 NOTE — ED PROVIDER NOTES
"ED Provider Note    Scribed for Maxwell Goodman M.D. by Maxwell Goodman M.D.. 3/3/2021,  6:35 PM.    CHIEF COMPLAINT  Chief Complaint   Patient presents with   • Dental Pain     L upper \"for years\"       HPI  Paul Chamberlain is a 55 y.o. male who presents to the Emergency Department for chronic left upper dental pain that he said he has had for years.  He has not had any fevers.  There is been no drainage.  He says that he was supposed to have oral surgery in [place], but did not.  He is resting comfortably.  He has not had any recent illness including fevers or chills or cough or shortness of breath.  The pain is dull, throbbing, nonradiating, moderate.    REVIEW OF SYSTEMS  See HPI for further details. All other systems are negative.     PAST MEDICAL HISTORY   has a past medical history of Asthma, Avascular necrosis ([facility]), COPD (chronic obstructive pulmonary disease) ([facility]), DDD (degenerative disc disease), cervical, DDD (degenerative disc disease), thoracolumbar, HIV positive ([facility]) (04/1990), HSV-2 (herpes simplex virus 2) infection, Neuropathy, and Tinnitus.    SOCIAL HISTORY  Social History     Tobacco Use   • Smoking status: Never Smoker   • Smokeless tobacco: Current User     Types: Chew   Substance and Sexual Activity   • Alcohol use: Not Currently   • Drug use: Yes     Types: Inhaled     Comment: marijuana daily   • Sexual activity: Not on file     Social History     Substance and Sexual Activity   Drug Use Yes   • Types: Inhaled    Comment: marijuana daily       SURGICAL HISTORY   has a past surgical history that includes fistula in ano repair and other orthopedic surgery (Left).    CURRENT MEDICATIONS  Home Medications    **Home medications have not yet been reviewed for this encounter**         ALLERGIES  Allergies   Allergen Reactions   • Ceprotin [Protein C Concentrate, Human]      vomiting       PHYSICAL EXAM  VITAL SIGNS: /82   Pulse 76   Temp 37 °C (98.6 °F) (Temporal)   Resp " "20   Ht 1.702 m (5' 7\")   Wt 59.7 kg (131 lb 9.8 oz)   SpO2 96%   BMI 20.61 kg/m²   Pulse ox interpretation: I interpret this pulse ox as normal.  Constitutional: Alert in no apparent distress.  HENT: Remaining teeth in the left maxilla are eroded to the gumline.  No sign of dental abscess.  Eyes: Pupils are equal and reactive, Conjunctiva normal, Non-icteric.   Neck: Normal range of motion, Supple, No stridor.    Cardiovascular: Normal peripheral perfusion  Thorax & Lungs: Unlabored respirations, equal chest expansion, no accessory muscle use  Abdomen: Non-distended  Skin:  No erythema, No rash.   Back: Normal alignment and ROM  Extremities: No gross deformity  Musculoskeletal: Good range of motion in all major joints.   Neurologic: Alert, Normal motor function, No focal deficits noted.   Psychiatric: Affect normal, Judgment normal, Mood normal.        COURSE & MEDICAL DECISION MAKING  Nursing notes, VS, PMSFHx reviewed in chart.     6:35 PM Patient seen and examined at bedside.  He has chronic dental pain, and chronically eroded dental caries that would benefit from extraction.  There is no sign of dental abscess.  Will be started on penicillin and Naprosyn, prescribed the same medications for home, and given the contact information for oral surgery, and have also placed an oral surgery referral in the computer.     The patient will return for new or worsening symptoms and is stable at the time of discharge.    The patient is referred to a primary physician for blood pressure management, diabetic screening, and for all other preventative health concerns.      DISPOSITION:  Patient will be discharged home in stable condition.    FOLLOW UP:  Dion Mosher D.M.D.  [address] [place] [address]  [address]  [facility] [address]  [phone]    Schedule an appointment as soon as possible for a visit         OUTPATIENT MEDICATIONS:  New Prescriptions    NAPROXEN (NAPROSYN) 500 MG TAB    Take 1 tablet by mouth 2 times a day, with " meals.    PENICILLIN V POTASSIUM (VEETID) 500 MG TAB    Take 1 tablet by mouth 4 times a day for 10 days.         FINAL IMPRESSION  1. Pain, dental    2. Dental infection

## 2021-03-04 NOTE — PROGRESS NOTES
3/3/21 met patient and sister in [facility] patient education room to continue work from last week. [employer] application completed through [employer] under sister's name to provide free phone/text services to increase communication with patient and healthcare providers. Patient states since his fall last week at home in which he hit his face, he has had swelling on his left face and has worsening dental pain. He is concerned about an abscess, patient's sister states he had been told several years ago this tooth would likely need to be extracted. Unfortunately, patient does not have dental benefits under his medicaid. Provided free resources for dental services in [place]-patient's sister verbalized that patient has been turned away from dental practices in the past due to his HIV status. Patient thinks he will need oral surgeon vs dentist to manage this. Patient has PCP appointment this afternoon to follow up on falls over the last week. Notified MA about swelling/dental pain to have PCP address at visit. Provided PCP with RTC Access application to complete at visit and return to CCM RN. Will plan to arrange delivery of DME either by Care Chest staff or CCM staff when it is available. Patient states they are usually at home isolating due to Covid precautions and would be available anytime for delivery.

## 2021-03-04 NOTE — ED TRIAGE NOTES
"BIB [facility] to B [facility] from   Chief Complaint   Patient presents with   • Dental Pain     L upper \"for years\"     Pt has no teeth, L upper is red and swollen. Pt has also had frequent falls for the last year, and started wearing a helmet. Sister at the bedside. Chart up for ERP.  "

## 2021-03-04 NOTE — PROGRESS NOTES
Chief Complaint   Patient presents with   • Other     dental infection, med. change       Subjective:     HPI:   Paul Chamberlain is a 55 y.o. male here to discuss the evaluation and management of:        Dental infection  Patient reports that last night he began having upper left dental pain, this morning he woke up with edema along the left side of his face extending up to his periorbital region.  He denies fever.  He reports a foul taste in his mouth.      ROS  Review of Systems   Constitutional: Positive for malaise/fatigue. Negative for chills, fever and weight loss.   Respiratory: Negative for cough, shortness of breath and wheezing.    Cardiovascular: Negative for chest pain, palpitations and leg swelling.   Gastrointestinal: Negative for abdominal pain, blood in stool, constipation and diarrhea.         Allergies   Allergen Reactions   • Ceprotin [Protein C Concentrate, Human]      vomiting       Current medicines (including changes today)  Current Outpatient Medications   Medication Sig Dispense Refill   • ondansetron (ZOFRAN ODT) 4 MG TABLET DISPERSIBLE Take 1 tablet by mouth every 6 hours as needed for Nausea. 20 tablet 11   • albuterol (PROVENTIL HFA) 108 (90 Base) MCG/ACT Aero Soln inhalation aerosol Inhale 2 puffs by mouth every 6 hours as needed for shortness of breath. 6.7 g 11   • acyclovir (ZOVIRAX) 400 MG tablet Take 1 tablet by mouth 2 times a day. 60 tablet 5   • pantoprazole (PROTONIX) 20 MG tablet Take 1 tablet by mouth daily. 30 tablet 3   • Tiotropium Bromide-Olodaterol (STIOLTO RESPIMAT) 2.5-2.5 MCG/ACT Aero Soln Inhale 2 puffs by mouth daily. 4 g 5   • loratadine (CLARITIN REDITABS) 10 MG dissolvable tablet Take 1 tablet by mouth every day. 30 tablet 11   • Darunavir-Cobicistat (PREZCOBIX) 800-150 MG Tab Take 1 tablet by mouth daily. 30 tablet 5   • Abacavir-Dolutegravir-Lamivud (TRIUMEQ) 600- MG Tab Take 1 tablet by mouth daily. 30 tablet 3   • vitamin D, Ergocalciferol,  (DRISDOL) 1.25 MG (74646 UT) Cap capsule Take 1 capsule by mouth every 28 days. 3 capsule 0   • ibuprofen (MOTRIN) 800 MG Tab Take 1 Tab by mouth 3 times a day. 90 Tab 11   • dicyclomine (BENTYL) 10 MG Cap Take 1 Cap by mouth 3 times a day. 90 Cap 3   • Misc. Devices Misc Please provide patient with a wheelchair 1 Each 0   • Misc. Devices Misc Please provide patient with a power wheelchair 1 Each 0   • Misc. Devices Misc Please provide the patient with 1 case of Ensure complete per month.  Patient is not diabetic.  Patient is not lactose intolerant. 1 Each 0   • Misc. Devices Misc Please provide the patient with a pull-up style briefs for incontinence. 100 Each 11   • permethrin (ELIMITE) 5 % Cream Apply topically to affected area one time for 1 dose. Apply to skin from neck down, leave in place for 8 hours then shower off. 60 g 0   • Misc. Devices Misc 1 Device by Does not apply route Q30 DAYS. Please provide the patient with 1 case of Ensure/boost monthly. 1 Device 0   • acyclovir (ZOVIRAX) 800 MG Tab Take 1 Tab by mouth 3 times a day. With herpes outbreak 15 Tab 3     No current facility-administered medications for this visit.       Social History     Tobacco Use   • Smoking status: Never Smoker   • Smokeless tobacco: Current User     Types: Chew   Substance Use Topics   • Alcohol use: Not on file   • Drug use: Not on file       Patient Active Problem List    Diagnosis Date Noted   • Dental infection 03/03/2021   • Numbness 02/24/2021   • Rash 02/24/2021   • Vitamin D deficiency 07/13/2020   • Falls 07/13/2020   • Palpitations 07/13/2020   • Hayfever 07/13/2020   • Encounter to establish care 06/17/2020   • HSV-2 (herpes simplex virus 2) infection 06/17/2020   • HIV infection (HCC) 06/17/2020   • Moderate persistent asthma without complication 06/17/2020       Family History   Problem Relation Age of Onset   • Cancer Mother         unknown type, ovarian CA   • Arthritis Mother         RA   • Cancer Father         " prostate CA   • Asthma Father    • Arthritis Father         RA   • Cancer Sister         unknown   • Cancer Maternal Grandmother         unknown   • Arthritis Sister         RA          Objective:     BP (!) 96/88 (BP Location: Left arm, Patient Position: Sitting, BP Cuff Size: Adult)   Pulse 88   Temp 36.7 °C (98 °F) (Temporal)   Resp 16   Ht 1.702 m (5' 7\")   Wt 59.7 kg (131 lb 11.2 oz)   SpO2 97%  Body mass index is 20.63 kg/m².    Physical Exam:  Physical Exam   Constitutional: He is oriented to person, place, and time and well-developed, well-nourished, and in no distress. No distress.   HENT:   Head: Normocephalic. Head is with left periorbital erythema.   Right Ear: Tympanic membrane and external ear normal.   Left Ear: Tympanic membrane and external ear normal.   Mouth/Throat: Abnormal dentition. Dental abscesses and dental caries present. Oropharyngeal exudate, posterior oropharyngeal edema and posterior oropharyngeal erythema present.   Periorbital swelling left side   Eyes: Pupils are equal, round, and reactive to light. Conjunctivae and EOM are normal.   Neck: No tracheal deviation present.   Cardiovascular: Normal rate, regular rhythm, normal heart sounds and intact distal pulses.   Pulmonary/Chest: Effort normal and breath sounds normal.   Abdominal: Soft. Bowel sounds are normal.   Musculoskeletal:         General: Normal range of motion.      Cervical back: Normal range of motion and neck supple.   Lymphadenopathy:        Head (right side): No preauricular adenopathy present.        Head (left side): No preauricular adenopathy present.     He has no cervical adenopathy.   Neurological: He is alert and oriented to person, place, and time. He has normal sensation, normal strength and intact cranial nerves. Gait normal.   Skin: Skin is warm and dry.   Psychiatric: Affect and judgment normal.       Assessment and Plan:     The following treatment plan was discussed:    1. Dental infection  -new " problem, unstable.  I am concerned about his periorbital edema and erythema.  I have advised the patient to go to the emergency room, he is agreeable.  Patient transported via [facility] to ER.   2. Nausea  ondansetron (ZOFRAN ODT) 4 MG TABLET DISPERSIBLE  -Chronic problem, stable.  Medication refill.       Any change or worsening of signs or symptoms, patient encouraged to follow-up or report to emergency room for further evaluation. Patient verbalizes understanding and agrees.    Follow-Up: Return in about 1 week (around 3/10/2021) for After d/c from hospital.      PLEASE NOTE: This dictation was created using voice recognition software. I have made every reasonable attempt to correct obvious errors, but I expect that there are errors of grammar and possibly content that I did not discover before finalizing the note.

## 2021-03-04 NOTE — PROGRESS NOTES
3/4/21 TC to Dr Mosher's office to inquire about billing. Patient referred to oralmaxillosurgery after ED visit yesterday.     Per office, services would fall under [facility], not medical. Approximate self pay costs: $90 for consult, $270 extraction per tooth, $115 for xray.     Message left with [facility] to see if patient would be eligible for their services. Received call back, patient will have to call himself to complete screening [phone], waitlist of around [phone] people. Will notify patient during next outreach.

## 2021-03-04 NOTE — ASSESSMENT & PLAN NOTE
Patient reports that last night he began having upper left dental pain, this morning he woke up with edema along the left side of his face extending up to his periorbital region.  He denies fever.  He reports a foul taste in his mouth.   This is a \"Welcome to [employer]"  Initial Preventive Physical Examination (IPPE) providing Personalized Prevention Plan Services (Performed in the first 12 months of enrollment)    I have reviewed the patient's medical history in detail and updated the computerized patient record. History     Past Medical History:   Diagnosis Date    Allergic rhinitis     Asthma     Cardiac stress echo, normal 11/02/2012    Normal maximal stress echo study. EF 60%. Ex time 9 min 45 sec.  Chondromalacia patella     Colon polyps     Diabetes mellitus (HCC)     GERD (gastroesophageal reflux disease)     History of pneumonia 01/2012    AFB smear positive. 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     Hyperlipidemia     Hypertension     Menopause     Plantar fasciitis     left    PSVT (paroxysmal supraventricular tachycardia) ([facility])     A-V dharmesh reentrant tachycardia      Past Surgical History:   Procedure Laterality Date    ENDOSCOPY, COLON, DIAGNOSTIC      polyp    HX CERVICAL POLYPECTOMY      HX CYST INCISION AND DRAINAGE Right 10 or more years    HX DILATION AND CURETTAGE      HX GYN      polyp on cervix    HX POLYPECTOMY      from rectum     Current Outpatient Medications   Medication Sig Dispense Refill    losartan (COZAAR) 25 mg tablet take 1 tablet by mouth once daily 90 Tab 2    potassium chloride (K-DUR, KLOR-CON) 20 mEq tablet take 1 tablet by mouth once daily 90 Tab 3    metoprolol succinate (TOPROL-XL) 50 mg XL tablet take 1 tablet by mouth once daily 90 Tab 3    fluticasone propionate (FLONASE) 50 mcg/actuation nasal spray instill 2 sprays into each nostril once daily 16 g 5    metFORMIN ER (GLUCOPHAGE XR) 500 mg tablet take 1 tablet by mouth once daily with dinner 90 Tab 3    hydroCHLOROthiazide (HYDRODIURIL) 12.5 mg tablet take 1 tablet by mouth once daily 90 Tab 3    clotrimazole-betamethasone (LOTRISONE) topical cream Apply  to both ear canals and affected part of outer ear twice a day with a finger. 45 g 3    cholecalciferol (VITAMIN D3) 1,000 unit cap Take 1,000 Units by mouth daily.  simvastatin (ZOCOR) 20 mg tablet take 1 tablet by mouth at bedtime 30 Tab 6    PROAIR HFA 90 mcg/actuation inhaler inhale 2 puffs by mouth every 4 hours if needed for wheezing 1 Inhaler 5    LORazepam (ATIVAN) 1 mg tablet TAKE 1 TABLET BY MOUTH EVERY 8 HOURS AS NEEDED FOR ANXIETY 30 Tab 0    carboxymethylcellulose sodium (REFRESH LIQUIGEL) 1 % dlgl ophthalmic solution Apply  to eye.        Allergies   Allergen Reactions    Altace [Ramipril] Cough    Penicillins Other (comments)     Hands peel    Sulfur Itching       Family History   Problem Relation Age of Onset   [employer] Arthritis-osteo Mother     Hypertension Mother              Cancer Father         bone cancer    Hypertension Sister     Hypertension Sister     Hypertension Sister     Breast Cancer Maternal Aunt     Breast Cancer Paternal Aunt     Diabetes Other     Stroke Other     Other Sister         twin sister - osteopenia and low vitamin D levels     Social History     Tobacco Use    Smoking status: Never Smoker    Smokeless tobacco: Never Used   Substance Use Topics    Alcohol use: No       Depression Risk Screen     3 most recent PHQ Screens 8/13/2020   Little interest or pleasure in doing things Not at all   Feeling down, depressed, irritable, or hopeless Not at all   Total Score PHQ 2 0   Trouble falling or staying asleep, or sleeping too much -   Feeling tired or having little energy -   Poor appetite, weight loss, or overeating -   Feeling bad about yourself - or that you are a failure or have let yourself or your family down -   Trouble concentrating on things such as school, work, reading, or watching TV -   Moving or speaking so slowly that other people could have noticed; or the opposite being so fidgety that others notice -   Thoughts of being better off dead, or hurting yourself in some way -   PHQ 9 Score -   How difficult have these problems made it for you to do your work, take care of your home and get along with others -       Alcohol Risk Factor Screening:   Do you average 1 drink per night or more than 7 drinks a week:  No    On any one occasion in the past three months have you have had more than 3 drinks containing alcohol:  No      Functional Ability and Level of Safety   Diet: The patient is prescribed and does not follow the special diet. Hearing: Hearing is good. Vision Screening:  Vision is good. OU 20/15   OD 20/15   With correction     Activities of Daily Living: The home contains: no safety equipment. Patient does total self care     Ambulation: with no difficulty     Exercise level: moderately active     Fall Risk Screen:  Fall Risk Assessment, last 12 mths 8/13/2020   Able to walk? Yes   Fall in past 12 months? No     Abuse Screen:  Patient is not abused       Screening EKG   EKG order placed: No    Patient Care Team   Patient Care Team:  Park Gillespie MD as PCP - General (Internal Medicine)  Park Gillespie MD as PCP - [facility] Empaneled Provider  Shannan Hobson MD (Gastroenterology)  Majo Fierro DO (Cardiology)  Elvira Romberg, MD as Surgeon (General Surgery)  Aurora Olivares MD (Podiatry)  Alexandrea Shaw MD (Otolaryngology)  Sandra Jefferson MD (Allergy)  Fernando Rios MD (Obstetrics & Gynecology)  Jose Miguel Hester MD (Ophthalmology)     End of Life Planning   Advanced care planning directives were discussed with the patient and /or family/caregiver.      Assessment/Plan   Education and counseling provided:  Are appropriate based on today's review and evaluation  End-of-Life planning (with patient's consent)  Pneumococcal Vaccine  Influenza Vaccine  Screening Mammography  Colorectal cancer screening tests  Cardiovascular screening blood test  Bone mass measurement (DEXA)  Screening for glaucoma  Medical nutrition therapy for individuals with diabetes or renal disease  Shingrix vaccine    Diagnoses and all orders for this visit:    1. Essential hypertension  -     CBC WITH AUTOMATED DIFF; Future  -     HEMOGLOBIN A1C WITH EAG; Future  -     LIPID PANEL; Future  -     MAGNESIUM; Future  -     METABOLIC PANEL, COMPREHENSIVE; Future  -     MICROALBUMIN, UR, RAND W/ MICROALB/CREAT RATIO; Future  -     TSH 3RD GENERATION; Future  -     URINALYSIS W/MICROSCOPIC; Future  -     VITAMIN D, 25 HYDROXY; Future    2. AVNRT (AV dharmesh re-entry tachycardia) ([facility] [address]  -     CBC WITH AUTOMATED DIFF; Future  -     HEMOGLOBIN A1C WITH EAG; Future  -     LIPID PANEL; Future  -     MAGNESIUM; Future  -     METABOLIC PANEL, COMPREHENSIVE; Future  -     MICROALBUMIN, UR, RAND W/ MICROALB/CREAT RATIO; Future  -     TSH 3RD GENERATION; Future  -     URINALYSIS W/MICROSCOPIC; Future  -     VITAMIN D, 25 HYDROXY; Future    3. Type 2 diabetes mellitus without complication, without long-term current use of insulin (HCC)  -      DIABETES FOOT EXAM  -     CBC WITH AUTOMATED DIFF; Future  -     HEMOGLOBIN A1C WITH EAG; Future  -     LIPID PANEL; Future  -     MAGNESIUM; Future  -     METABOLIC PANEL, COMPREHENSIVE; Future  -     MICROALBUMIN, UR, RAND W/ MICROALB/CREAT RATIO; Future  -     TSH 3RD GENERATION; Future  -     URINALYSIS W/MICROSCOPIC; Future  -     VITAMIN D, 25 HYDROXY; Future    4. Hyperlipidemia, unspecified hyperlipidemia type  -     CBC WITH AUTOMATED DIFF; Future  -     HEMOGLOBIN A1C WITH EAG; Future  -     LIPID PANEL; Future  -     MAGNESIUM; Future  -     METABOLIC PANEL, COMPREHENSIVE; Future  -     MICROALBUMIN, UR, RAND W/ MICROALB/CREAT RATIO; Future  -     TSH 3RD GENERATION; Future  -     URINALYSIS W/MICROSCOPIC; Future  -     VITAMIN D, 25 HYDROXY; Future    5. Obesity (BMI 30.0-34.9)  -     CBC WITH AUTOMATED DIFF; Future  -     HEMOGLOBIN A1C WITH EAG; Future  -     LIPID PANEL; Future  -     MAGNESIUM;  Future  -     METABOLIC PANEL, COMPREHENSIVE; Future  -     MICROALBUMIN, UR, RAND W/ MICROALB/CREAT RATIO; Future  -     TSH 3RD GENERATION; Future  -     URINALYSIS W/MICROSCOPIC; Future  -     VITAMIN D, 25 HYDROXY; Future    6. Mild persistent asthma without complication  -     CBC WITH AUTOMATED DIFF; Future  -     HEMOGLOBIN A1C WITH EAG; Future  -     LIPID PANEL; Future  -     MAGNESIUM; Future  -     METABOLIC PANEL, COMPREHENSIVE; Future  -     MICROALBUMIN, UR, RAND W/ MICROALB/CREAT RATIO; Future  -     TSH 3RD GENERATION; Future  -     URINALYSIS W/MICROSCOPIC; Future  -     VITAMIN D, 25 HYDROXY; Future    7. Chronic pain of both knees  -     CBC WITH AUTOMATED DIFF; Future  -     HEMOGLOBIN A1C WITH EAG; Future  -     LIPID PANEL; Future  -     MAGNESIUM; Future  -     METABOLIC PANEL, COMPREHENSIVE; Future  -     MICROALBUMIN, UR, RAND W/ MICROALB/CREAT RATIO; Future  -     TSH 3RD GENERATION; Future  -     URINALYSIS W/MICROSCOPIC; Future  -     VITAMIN D, 25 HYDROXY; Future    8. Seasonal allergic rhinitis due to pollen  -     CBC WITH AUTOMATED DIFF; Future  -     HEMOGLOBIN A1C WITH EAG; Future  -     LIPID PANEL; Future  -     MAGNESIUM; Future  -     METABOLIC PANEL, COMPREHENSIVE; Future  -     MICROALBUMIN, UR, RAND W/ MICROALB/CREAT RATIO; Future  -     TSH 3RD GENERATION; Future  -     URINALYSIS W/MICROSCOPIC; Future  -     VITAMIN D, 25 HYDROXY; Future    9. Gastroesophageal reflux disease without esophagitis  -     CBC WITH AUTOMATED DIFF; Future  -     HEMOGLOBIN A1C WITH EAG; Future  -     LIPID PANEL; Future  -     MAGNESIUM; Future  -     METABOLIC PANEL, COMPREHENSIVE; Future  -     MICROALBUMIN, UR, RAND W/ MICROALB/CREAT RATIO; Future  -     TSH 3RD GENERATION; Future  -     URINALYSIS W/MICROSCOPIC; Future  -     VITAMIN D, 25 HYDROXY; Future    10. Noncompliance  -     CBC WITH AUTOMATED DIFF; Future  -     HEMOGLOBIN A1C WITH EAG; Future  -     LIPID PANEL; Future  -     MAGNESIUM;  Future  -     METABOLIC PANEL, COMPREHENSIVE; Future  -     MICROALBUMIN, UR, RAND W/ MICROALB/CREAT RATIO; Future  -     TSH 3RD GENERATION; Future  -     URINALYSIS W/MICROSCOPIC; Future  -     VITAMIN D, 25 HYDROXY; Future    11. Vitamin D deficiency, unspecified   -     VITAMIN D, 25 HYDROXY; Future    12. Welcome to Medicare preventive visit    15. Encounter for immunization  -     ADMIN PNEUMOCOCCAL VACCINE  -     PNEUMOCOCCAL CONJ VACCINE 13 VALENT IM    14.  Advanced directives, counseling/discussion        Health Maintenance Due   Topic Date Due    Influenza Age 5 to Adult  08/01/2020

## 2021-03-09 ENCOUNTER — TELEPHONE (OUTPATIENT)
Dept: MEDICAL GROUP | Facility: MEDICAL CENTER | Age: 56
End: 2021-03-09

## 2021-03-09 NOTE — TELEPHONE ENCOUNTER
"· Medical Opinion to Operate Battery-Powered Scooter/Wheelchair paperwork received from [facility] requiring provider signature.     · All appropriate fields completed by Medical Assistant: Yes    · Paperwork placed in \"MA to Provider\" folder/basket. Awaiting provider completion/signature.     · 03/09/2021- vd. Paperwork, faxed (confirmed), scanned to pt. Chart.  "

## 2021-05-05 ENCOUNTER — PHARMACY VISIT (OUTPATIENT)
Dept: PHARMACY | Facility: MEDICAL CENTER | Age: 56
End: 2021-05-05
Payer: COMMERCIAL

## 2021-05-05 PROCEDURE — RXMED WILLOW AMBULATORY MEDICATION CHARGE: Performed by: NURSE PRACTITIONER

## 2021-05-07 ENCOUNTER — PHARMACY VISIT (OUTPATIENT)
Dept: PHARMACY | Facility: MEDICAL CENTER | Age: 56
End: 2021-05-07
Payer: COMMERCIAL

## 2021-06-11 ENCOUNTER — PHARMACY VISIT (OUTPATIENT)
Dept: PHARMACY | Facility: MEDICAL CENTER | Age: 56
End: 2021-06-11
Payer: COMMERCIAL

## 2021-06-11 DIAGNOSIS — G89.29 OTHER CHRONIC PAIN: ICD-10-CM

## 2021-06-11 DIAGNOSIS — E55.9 VITAMIN D DEFICIENCY: ICD-10-CM

## 2021-06-11 PROCEDURE — RXMED WILLOW AMBULATORY MEDICATION CHARGE: Performed by: NURSE PRACTITIONER

## 2021-06-15 PROCEDURE — RXMED WILLOW AMBULATORY MEDICATION CHARGE: Performed by: NURSE PRACTITIONER

## 2021-06-15 RX ORDER — IBUPROFEN 800 MG/1
800 TABLET ORAL 3 TIMES DAILY
Qty: 90 TABLET | Refills: 11 | Status: SHIPPED | OUTPATIENT
Start: 2021-06-15

## 2021-06-15 RX ORDER — ERGOCALCIFEROL 1.25 MG/1
50000 CAPSULE ORAL
Qty: 3 CAPSULE | Refills: 0 | Status: SHIPPED | OUTPATIENT
Start: 2021-06-15 | End: 2021-07-19 | Stop reason: SDUPTHER

## 2021-06-16 ENCOUNTER — PHARMACY VISIT (OUTPATIENT)
Dept: PHARMACY | Facility: MEDICAL CENTER | Age: 56
End: 2021-06-16
Payer: COMMERCIAL

## 2021-07-19 DIAGNOSIS — E55.9 VITAMIN D DEFICIENCY: ICD-10-CM

## 2021-07-19 PROCEDURE — RXMED WILLOW AMBULATORY MEDICATION CHARGE: Performed by: NURSE PRACTITIONER

## 2021-07-20 ENCOUNTER — PHARMACY VISIT (OUTPATIENT)
Dept: PHARMACY | Facility: MEDICAL CENTER | Age: 56
End: 2021-07-20
Payer: COMMERCIAL

## 2021-07-21 RX ORDER — ERGOCALCIFEROL 1.25 MG/1
50000 CAPSULE ORAL
Qty: 3 CAPSULE | Refills: 1 | Status: SHIPPED | OUTPATIENT
Start: 2021-07-21

## 2023-03-09 ENCOUNTER — HOME HEALTH ADMISSION (OUTPATIENT)
Dept: HOME HEALTH SERVICES | Facility: HOME HEALTHCARE | Age: 58
End: 2023-03-09
Payer: MEDICAID

## 2023-06-22 ENCOUNTER — HOSPITAL ENCOUNTER (OUTPATIENT)
Dept: RADIOLOGY | Facility: MEDICAL CENTER | Age: 58
End: 2023-06-22
Attending: NURSE PRACTITIONER
Payer: MEDICAID

## 2023-06-22 DIAGNOSIS — M79.89 LEFT LEG SWELLING: ICD-10-CM

## 2024-06-08 ENCOUNTER — APPOINTMENT (OUTPATIENT)
Dept: RADIOLOGY | Facility: MEDICAL CENTER | Age: 59
End: 2024-06-08
Attending: EMERGENCY MEDICINE
Payer: MEDICAID

## 2024-06-08 ENCOUNTER — HOSPITAL ENCOUNTER (EMERGENCY)
Facility: MEDICAL CENTER | Age: 59
End: 2024-06-08
Attending: EMERGENCY MEDICINE
Payer: MEDICAID

## 2024-06-08 VITALS
RESPIRATION RATE: 16 BRPM | OXYGEN SATURATION: 93 % | HEART RATE: 105 BPM | TEMPERATURE: 98 F | BODY MASS INDEX: 17.03 KG/M2 | SYSTOLIC BLOOD PRESSURE: 152 MMHG | DIASTOLIC BLOOD PRESSURE: 88 MMHG | HEIGHT: 69 IN | WEIGHT: 115 LBS

## 2024-06-08 DIAGNOSIS — I95.9 HYPOTENSION, UNSPECIFIED HYPOTENSION TYPE: ICD-10-CM

## 2024-06-08 DIAGNOSIS — S09.90XA CLOSED HEAD INJURY, INITIAL ENCOUNTER: ICD-10-CM

## 2024-06-08 DIAGNOSIS — S01.81XA LACERATION OF FOREHEAD, INITIAL ENCOUNTER: ICD-10-CM

## 2024-06-08 LAB
ABO GROUP BLD: NORMAL
ALBUMIN SERPL BCP-MCNC: 2.7 G/DL (ref 3.2–4.9)
ALBUMIN/GLOB SERPL: 1 G/DL
ALP SERPL-CCNC: 65 U/L (ref 30–99)
ALT SERPL-CCNC: 19 U/L (ref 2–50)
ANION GAP SERPL CALC-SCNC: 10 MMOL/L (ref 7–16)
APTT PPP: 27.4 SEC (ref 24.7–36)
AST SERPL-CCNC: 27 U/L (ref 12–45)
BILIRUB SERPL-MCNC: <0.2 MG/DL (ref 0.1–1.5)
BLD GP AB SCN SERPL QL: NORMAL
BUN SERPL-MCNC: 12 MG/DL (ref 8–22)
CALCIUM ALBUM COR SERPL-MCNC: 8.5 MG/DL (ref 8.5–10.5)
CALCIUM SERPL-MCNC: 7.5 MG/DL (ref 8.5–10.5)
CHLORIDE SERPL-SCNC: 111 MMOL/L (ref 96–112)
CO2 SERPL-SCNC: 22 MMOL/L (ref 20–33)
CREAT SERPL-MCNC: 0.25 MG/DL (ref 0.5–1.4)
ERYTHROCYTE [DISTWIDTH] IN BLOOD BY AUTOMATED COUNT: 56.9 FL (ref 35.9–50)
ETHANOL BLD-MCNC: <10.1 MG/DL
GFR SERPLBLD CREATININE-BSD FMLA CKD-EPI: 146 ML/MIN/1.73 M 2
GLOBULIN SER CALC-MCNC: 2.6 G/DL (ref 1.9–3.5)
GLUCOSE SERPL-MCNC: 99 MG/DL (ref 65–99)
HCT VFR BLD AUTO: 37.7 % (ref 42–52)
HGB BLD-MCNC: 12.3 G/DL (ref 14–18)
INR PPP: 1.03 (ref 0.87–1.13)
MCH RBC QN AUTO: 34.6 PG (ref 27–33)
MCHC RBC AUTO-ENTMCNC: 32.6 G/DL (ref 32.3–36.5)
MCV RBC AUTO: 105.9 FL (ref 81.4–97.8)
PLATELET # BLD AUTO: 286 K/UL (ref 164–446)
PMV BLD AUTO: 10.1 FL (ref 9–12.9)
POTASSIUM SERPL-SCNC: 3.9 MMOL/L (ref 3.6–5.5)
PROT SERPL-MCNC: 5.3 G/DL (ref 6–8.2)
PROTHROMBIN TIME: 13.6 SEC (ref 12–14.6)
RBC # BLD AUTO: 3.56 M/UL (ref 4.7–6.1)
RH BLD: NORMAL
SODIUM SERPL-SCNC: 143 MMOL/L (ref 135–145)
WBC # BLD AUTO: 7.9 K/UL (ref 4.8–10.8)

## 2024-06-08 PROCEDURE — 304217 HCHG IRRIGATION SYSTEM

## 2024-06-08 PROCEDURE — 99283 EMERGENCY DEPT VISIT LOW MDM: CPT | Performed by: SURGERY

## 2024-06-08 PROCEDURE — 86900 BLOOD TYPING SEROLOGIC ABO: CPT

## 2024-06-08 PROCEDURE — 86850 RBC ANTIBODY SCREEN: CPT

## 2024-06-08 PROCEDURE — 80053 COMPREHEN METABOLIC PANEL: CPT

## 2024-06-08 PROCEDURE — 99285 EMERGENCY DEPT VISIT HI MDM: CPT

## 2024-06-08 PROCEDURE — 82077 ASSAY SPEC XCP UR&BREATH IA: CPT

## 2024-06-08 PROCEDURE — 304999 HCHG REPAIR-SIMPLE/INTERMED LEVEL 1

## 2024-06-08 PROCEDURE — 305950 HCHG YELLOW TRAUMA ACT PRE-NOTIFY NO CC

## 2024-06-08 PROCEDURE — 36415 COLL VENOUS BLD VENIPUNCTURE: CPT

## 2024-06-08 PROCEDURE — 72170 X-RAY EXAM OF PELVIS: CPT

## 2024-06-08 PROCEDURE — 700105 HCHG RX REV CODE 258: Mod: UD | Performed by: EMERGENCY MEDICINE

## 2024-06-08 PROCEDURE — 85027 COMPLETE CBC AUTOMATED: CPT

## 2024-06-08 PROCEDURE — 71045 X-RAY EXAM CHEST 1 VIEW: CPT

## 2024-06-08 PROCEDURE — 85610 PROTHROMBIN TIME: CPT

## 2024-06-08 PROCEDURE — 85730 THROMBOPLASTIN TIME PARTIAL: CPT

## 2024-06-08 PROCEDURE — 700101 HCHG RX REV CODE 250: Mod: UD | Performed by: EMERGENCY MEDICINE

## 2024-06-08 PROCEDURE — 70450 CT HEAD/BRAIN W/O DYE: CPT

## 2024-06-08 PROCEDURE — 86901 BLOOD TYPING SEROLOGIC RH(D): CPT

## 2024-06-08 PROCEDURE — 72125 CT NECK SPINE W/O DYE: CPT

## 2024-06-08 PROCEDURE — 303747 HCHG EXTRA SUTURE

## 2024-06-08 RX ORDER — SODIUM CHLORIDE 9 MG/ML
1000 INJECTION, SOLUTION INTRAVENOUS ONCE
Status: COMPLETED | OUTPATIENT
Start: 2024-06-08 | End: 2024-06-08

## 2024-06-08 RX ORDER — LIDOCAINE HYDROCHLORIDE AND EPINEPHRINE 10; 10 MG/ML; UG/ML
20 INJECTION, SOLUTION INFILTRATION; PERINEURAL ONCE
Status: COMPLETED | OUTPATIENT
Start: 2024-06-08 | End: 2024-06-08

## 2024-06-08 RX ORDER — LIDOCAINE HYDROCHLORIDE AND EPINEPHRINE 10; 10 MG/ML; UG/ML
INJECTION, SOLUTION INFILTRATION; PERINEURAL
Status: COMPLETED | OUTPATIENT
Start: 2024-06-08 | End: 2024-06-08

## 2024-06-08 RX ORDER — SODIUM CHLORIDE 9 MG/ML
INJECTION, SOLUTION INTRAVENOUS
Status: COMPLETED | OUTPATIENT
Start: 2024-06-08 | End: 2024-06-08

## 2024-06-08 RX ADMIN — SODIUM CHLORIDE 1000 ML: 9 INJECTION, SOLUTION INTRAVENOUS at 17:02

## 2024-06-08 RX ADMIN — LIDOCAINE HYDROCHLORIDE AND EPINEPHRINE 10 ML: 10; 10 INJECTION, SOLUTION INFILTRATION; PERINEURAL at 17:06

## 2024-06-08 RX ADMIN — LIDOCAINE HYDROCHLORIDE AND EPINEPHRINE 20 ML: 10; 10 INJECTION, SOLUTION INFILTRATION; PERINEURAL at 18:00

## 2024-06-08 RX ADMIN — SODIUM CHLORIDE 1000 ML: 9 INJECTION, SOLUTION INTRAVENOUS at 18:15

## 2024-06-08 ASSESSMENT — FIBROSIS 4 INDEX: FIB4 SCORE: 1.53

## 2024-06-09 NOTE — ED PROVIDER NOTES
ER Provider Note    Scribed for Dada Murcia M.D. by Maribell Mclean. 6/8/2024   4:57 PM    Primary Care Provider: CATALINA Rader    CHIEF COMPLAINT  Chief Complaint   Patient presents with    Trauma Yellow     EXTERNAL RECORDS REVIEWED  Inpatient Notes The patient was admitted in July of 2023 at [facility] for DVT.    HPI/ROS  LIMITATION TO HISTORY   Select: : None  OUTSIDE HISTORIAN(S):  EMS  at trauma bedside to confirm sequence of events and collateral information provided.     Paul Chamberlain is a 59 y.o. male who has history of asthma and is HIV positive presents to the ED via EMS as a Trauma Yellow for evaluation after a ground level fall onset earlier today. EMS describes that the patient is wheelchair bound and was home alone earlier today. The patient tried transferring from his wheelchair, fell onto the ground, struck his head on the bathroom doorknob, but denies any loss of consciousness. The patient's fall was unwitnessed and the patient reports that he was on the floor for about 5 minutes prior to being found. The patient was found by his sister, who called EMS. The patient was found tachycardic and hypotensive, so he received 500 cc of ESBL on scene via EMS. The patient is compliant with his HIV medication, but does not take blood thinning medication.     PAST MEDICAL HISTORY  Past Medical History:   Diagnosis Date    Asthma     Avascular necrosis (HCC)     both hips, both shoulders    COPD (chronic obstructive pulmonary disease) (HCC)     DDD (degenerative disc disease), cervical     DDD (degenerative disc disease), thoracolumbar     HIV positive (HCC) 04/1990    HSV-2 (herpes simplex virus 2) infection     Neuropathy     Tinnitus      SURGICAL HISTORY  Past Surgical History:   Procedure Laterality Date    FISTULA IN ANO REPAIR      OTHER ORTHOPEDIC SURGERY Left     S cut of tendon on 5th toe     FAMILY HISTORY  Family History   Problem Relation Age of Onset     Cancer Mother         unknown type, ovarian CA    Arthritis Mother         RA    Cancer Father         prostate CA    Asthma Father     Arthritis Father         RA    Cancer Sister         unknown    Cancer Maternal Grandmother         unknown    Arthritis Sister         RA     SOCIAL HISTORY   reports that he has never smoked. His smokeless tobacco use includes chew. He reports that he does not currently use alcohol. He reports current drug use. Drug: Inhaled.    CURRENT MEDICATIONS  Discharge Medication List as of 6/8/2024  7:56 PM        CONTINUE these medications which have NOT CHANGED    Details   vitamin D, Ergocalciferol, (DRISDOL) 1.25 MG (58151 UT) Cap capsule Take 1 capsule by mouth every 28 days., Disp-3 capsule, R-1, Normal      ibuprofen (MOTRIN) 800 MG Tab Take 1 tablet by mouth 3 times a day., Disp-90 tablet, R-11, Normal      !! Misc. Devices Misc Please provide patient with a wheelchair, Disp-1 Each, R-0, Print Rx Paper      !! Misc. Devices Misc Please provide patient with a power wheelchair, Disp-1 Each, R-0, Print Rx Paper      !! Misc. Devices Misc Please provide the patient with 1 case of Ensure complete per month.  Patient is not diabetic.  Patient is not lactose intolerant., Disp-1 Each, R-0, Print Rx Paper      !! Misc. Devices Misc Please provide the patient with a pull-up style briefs for incontinence., Disp-100 Each, R-11, Print Rx Paper      ondansetron (ZOFRAN ODT) 4 MG TABLET DISPERSIBLE Take 1 tablet by mouth every 6 hours as needed for Nausea., Disp-20 tablet, R-11, Normal      loratadine (CLARITIN REDITABS) 10 MG dissolvable tablet Take 1 tablet by mouth every day., Disp-30 tablet, R-11, Normal      dicyclomine (BENTYL) 10 MG Cap Take 1 Cap by mouth 3 times a day., Disp-90 Cap, R-3, Print Rx Paper      !! Misc. Devices Misc 1 Device by Does not apply route Q30 DAYS. Please provide the patient with 1 case of Ensure/boost monthly., Disp-1 Device,R-0, Print Plain Paper      acyclovir  "(ZOVIRAX) 800 MG Tab Take 1 Tab by mouth 3 times a day. With herpes outbreak, Disp-15 Tab,R-3, Normal       !! - Potential duplicate medications found. Please discuss with provider.        ALLERGIES  Allergies   Allergen Reactions    Ceprotin [Protein C Concentrate, Human]      vomiting      PHYSICAL EXAM  BP 98/82   Pulse (!) 123   Temp 36.7 °C (98 °F)   Resp 19   Ht 1.753 m (5' 9\")   Wt 52.2 kg (115 lb)   SpO2 91% Comment: RA  BMI 16.98 kg/m²      Constitutional: Well developed, Well nourished, Mild distress, Cachectic, Poor dentition.   HENT: Normocephalic, 5 cm laceration to the right forehead. No active bleeding.   Eyes: PERRLA, EOMI, Conjunctiva normal, No discharge.   Neck: No midline C-spine tenderness.   Cardiovascular: Normal heart rate, Normal rhythm.  No chest wall tenderness palpation  Thorax & Lungs: Clear to auscultation bilaterally, No respiratory distress.   Back: No midline T or L-spine tenderness palpation  Abdomen: Soft, No tenderness, No masses.   Skin: Warm, Dry, No rash.    Musculoskeletal: No major deformities noted.  Neurologic: Awake, alert. Moves all extremities spontaneously.  Psychiatric: Affect normal, Judgment normal, Mood normal.        DIAGNOSTIC STUDIES    Radiology:   This attending emergency physician has independently interpreted the diagnostic imaging associated with this visit and is awaiting the final reading from the radiologist.   Preliminary interpretation is a follows: No bleeding  Radiologist interpretation:   CT-CSPINE WITHOUT PLUS RECONS   Final Result         1. Multilevel degenerative disease without a definite fracture.      CT-HEAD W/O   Final Result         1. Right frontal scalp hematoma and laceration.   2. No definite acute intracranial abnormality.   3. Depressed nasal fracture of indeterminate age.         DX-CHEST-LIMITED (1 VIEW)   Final Result         No acute cardiopulmonary abnormalities are identified.      DX-PELVIS-1 OR 2 VIEWS   Final Result    "      1. No acute osseous abnormality.             Laceration Repair Procedure Note    Indication: Laceration    Procedure: The patient was placed in the appropriate position and anesthesia around the laceration was obtained by infiltration using 1% Lidocaine with epinephrine. The wound was minimally contaminated .The area was then irrigated with normal saline. The laceration was closed with 5-0 Ethilon using interrupted sutures. There were no additional lacerations requiring repair. The wound area was then dressed with a sterile dressing.      Total repaired wound length: 5 cm.     Other Items: Suture count: 6    The patient tolerated the procedure well.    Complications: None        COURSE & MEDICAL DECISION MAKING     INITIAL ASSESSMENT, COURSE AND PLAN    Care Narrative: Patient was originally head injury, did not qualify for TBI activation however the patient was tachycardic, hypotensive therefore the patient was upgraded to a trauma yellow.  Patient has a large laceration on his forehead, likely moderate blood loss.  Give the patient IV fluids with improvement of patient's blood pressure.  Sutured the patient's laceration, CT scan of the head and the neck were negative.  Patient will be discharged home, he will return in a week for suture removal.    4:57 PM - Patient was evaluated at bedside. Ordered for CT-Cspine w/o, CT-Head w/o, DX-Pelvis and DX-Chest to evaluate. The patient will be medicated with NS fluids for his symptoms. Patient verbalizes understanding and support with my plan of care.     5:37 PM - The patient was reevaluated at bedside. Laceration repair performed. See note above.     6:10 PM - The patient was reevaluated at bedside.     6:45 PM - The patient was reevaluated at bedside. Discussed discharge instructions and return precautions with the patient and they were cleared for discharge. Patient was given the opportunity to ask any further questions. He is comfortable with discharge at this  time.         DISPOSITION AND DISCUSSIONS    I have discussed management of the patient with the following physicians and DAWNA's:  None.    Discussion of management with other [facility] or appropriate source(s): None     Escalation of care considered, and ultimately not performed: acute inpatient care management, however at this time, the patient is most appropriate for outpatient management.    Barriers to care at this time, including but not limited to:  None known .     The patient will return for new or worsening symptoms and is stable at the time of discharge.    DISPOSITION:  Patient will be discharged home in stable condition.    FOLLOW UP:  [facility] Emergency Dept  [address] [place] [id number] [phone]    If symptoms worsen 7 days for suture removal       FINAL DIAGNOSIS  1. Closed head injury, initial encounter    2. Hypotension, unspecified hypotension type    3. Laceration of forehead, initial encounter      IMaribell (Scribe), am scribing for, and in the presence of, Dada Murcia M.D..    Electronically signed by: Maribell Mclean (Sugar[place]), 6/8/2024    IDada M.D. personally performed the services described in this documentation, as scribed by Maribell Mclean in my presence, and it is both accurate and complete.      The note accurately reflects work and decisions made by me.  Dada Murcia M.D.  6/8/2024  9:30 PM

## 2024-06-09 NOTE — ED NOTES
Pt arrives in blue pod by EMS, pt reportedly hypotensive 90's systolic with a heart rate in the 120's, pt placed on monitor confirming similar vitals, charge RN notified and pt upgraded to trauma yellow for shock index > 1, pt taken to trauma bay by this RN

## 2024-06-09 NOTE — ED TRIAGE NOTES
"Chief Complaint   Patient presents with    Trauma Yellow     Pt BIB [facility] s/p GLF with head trauma, taken to trauma bay for shock index > 1, GCS 15, NAD.    /81   Pulse (!) 109   Temp 36.7 °C (98 °F)   Resp 15   Ht 1.753 m (5' 9\")   Wt 52.2 kg (115 lb)   SpO2 97%   BMI 16.98 kg/m²     "

## 2024-06-09 NOTE — ED NOTES
Patient CRISTOBAL SU. 59 y.o. male involved in fall out of his wheelchair, hit head on bathroom doorknob. -LOC, -thinners. ESBL 500cc on scene per EMS.    Patient arrives w/ *no spinal immobilizations* in place.   Chief complaint of pain to head.  Medications administered en route: none    Patient reports history of HIV.

## 2024-06-09 NOTE — ED NOTES
Pt cleaned up and wheeled out to ER lobby with his family member. Cab voucher obtained from social work, cab company called and provided with information, they are en route.

## 2024-06-09 NOTE — ED NOTES
Pt arrives again in blue pod s/p trauma bay and CT, VSS on RA, GCS 15, NAD, BLEVINS, family at bedside, pt notified POC to await results

## 2024-06-09 NOTE — CONSULTS
CHIEF COMPLAINT: Fall from a wheelchair.     HISTORY OF PRESENT ILLNESS: The patient is a 59 year-old chronically ill man who was  injured when he fell out of his wheelchair.  He did sustain a laceration.  Vitals here revealed a shock index greater than 1 and he was upgraded to a trauma yellow.  He did not lose consciousness and he currently has a GCS of 15..  .    TRIAGE CATEGORY: The patient was triaged as a Trauma Yellow Activation. An expeditious primary and secondary survey with required adjuncts was conducted. See Trauma Narrator for full details.    PAST MEDICAL HISTORY:  has a past medical history of Asthma, Avascular necrosis ([facility]), COPD (chronic obstructive pulmonary disease) ([facility]), DDD (degenerative disc disease), cervical, DDD (degenerative disc disease), thoracolumbar, HIV positive ([facility]) (04/1990), HSV-2 (herpes simplex virus 2) infection, Neuropathy, and Tinnitus.    PAST SURGICAL HISTORY:  has a past surgical history that includes fistula in ano repair and other orthopedic surgery (Left).    ALLERGIES:   Allergies   Allergen Reactions    Ceprotin [Protein C Concentrate, Human]      vomiting       CURRENT MEDICATIONS:   Home Medications       Reviewed by Edwin Owusu R.N. (Registered Nurse) on 06/08/24 at 1753  Med List Status: <None>     Medication Last Dose Status   acyclovir (ZOVIRAX) 800 MG Tab  Active   dicyclomine (BENTYL) 10 MG Cap  Active   ibuprofen (MOTRIN) 800 MG Tab  Active   loratadine (CLARITIN REDITABS) 10 MG dissolvable tablet  Active   Misc. Devices Misc  Active   Misc. Devices Misc  Active   Misc. Devices Misc  Active   Misc. Devices Misc  Active   Misc. Devices Misc  Active   ondansetron (ZOFRAN ODT) 4 MG TABLET DISPERSIBLE  Active   vitamin D, Ergocalciferol, (DRISDOL) 1.25 MG (08351 UT) Cap capsule  Active                  Audit from Redirected Encounters    **Home medications have not yet been reviewed for this encounter**       FAMILY HISTORY: family history includes  "Arthritis in his father, mother, and sister; Asthma in his father; Cancer in his father, maternal grandmother, mother, and sister.    SOCIAL HISTORY:  reports that he has never smoked. His smokeless tobacco use includes chew. He reports that he does not currently use alcohol. He reports current drug use. Drug: Inhaled.    REVIEW OF SYSTEMS: Comprehensive review of systems is negative with the exception of the aforementioned HPI, PMH, and PSH bullets in accordance with CMS guidelines.    PHYSICAL EXAMINATION:      Vital Signs: /81   Pulse (!) 109   Temp 36.7 °C (98 °F)   Resp 15   Ht 1.753 m (5' 9\")   Wt 52.2 kg (115 lb)   SpO2 97%   Physical Exam  General: Laying in bed, cachectic and chronically ill-appearing  HEENT: Pupils equally round and reactive to light, extraocular muscles intact, oropharynx without lesions, scalp laceration  Neck: Supple with full range of motion  Chest: Bilateral breath sounds with symmetrical chest excursion, no crackles or wheezes  Cardiovascular: Regular rate and rhythm  Abdomen: Soft, nontender, nondistended, no incisions  : normal anatomy  Pelvis: Stable and nontender  Back: Stable without step-offs  Extremities: No open wounds or deformities  Neurologic: Grossly intact, no focal deficits, GCS 15  Vascular: Palpable radial and femoral pulses  Skin: Warm and dry  Psychiatric: Interacts appropriately    LABORATORY VALUES:   Recent Labs     06/08/24  1712   WBC 7.9   RBC 3.56*   HEMOGLOBIN 12.3*   HEMATOCRIT 37.7*   .9*   MCH 34.6*   MCHC 32.6   RDW 56.9*   PLATELETCT 286   MPV 10.1         Recent Labs     06/08/24  1712   INR 1.03     Recent Labs     06/08/24  1712   APTT 27.4   INR 1.03        IMAGING:   CT-CSPINE WITHOUT PLUS RECONS   Final Result         1. Multilevel degenerative disease without a definite fracture.      CT-HEAD W/O   Final Result         1. Right frontal scalp hematoma and laceration.   2. No definite acute intracranial abnormality.   3. " Depressed nasal fracture of indeterminate age.         DX-CHEST-LIMITED (1 VIEW)   Final Result         No acute cardiopulmonary abnormalities are identified.      DX-PELVIS-1 OR 2 VIEWS   Final Result         1. No acute osseous abnormality.          ASSESSMENT AND PLAN:   59-year-old chronically ill and wheelchair dependent man who fell out of his wheelchair.  He did sustain a scalp laceration which has been sutured in the emergency room.  He otherwise is without any evidence of significant injuries.  Given his chronic debilitation he may require medical admission.  No interventions are currently indicated by the trauma service.  The trauma service will be available for reevaluation as needed.  Discussed with Dr. Murcia.        DISPOSITION: Medical evaluation and admission for chronic debility and illness. [facility] Trauma Surgery Service will follow. Interval Trauma tertiary survey.             ____________________________________     Rodger Jules M.D.    DD: 6/8/2024  5:57 PM

## 2024-06-09 NOTE — ED NOTES
Reviewed discharge paperwork with patient. Patient verbalized understanding of instructions. Will f/u accordingly. Denies further questions at this time.